# Patient Record
Sex: FEMALE | Race: WHITE | Employment: OTHER | URBAN - METROPOLITAN AREA
[De-identification: names, ages, dates, MRNs, and addresses within clinical notes are randomized per-mention and may not be internally consistent; named-entity substitution may affect disease eponyms.]

---

## 2020-02-28 ENCOUNTER — HOSPITAL ENCOUNTER (INPATIENT)
Age: 70
LOS: 24 days | Discharge: HOME | DRG: 885 | End: 2020-03-23
Attending: EMERGENCY MEDICINE | Admitting: PSYCHIATRY & NEUROLOGY
Payer: COMMERCIAL

## 2020-02-28 PROBLEM — F20.9 SCHIZOPHRENIA (HCC): Status: ACTIVE | Noted: 2020-02-28

## 2020-02-28 PROCEDURE — 1240000000 HC EMOTIONAL WELLNESS R&B

## 2020-02-28 PROCEDURE — 99285 EMERGENCY DEPT VISIT HI MDM: CPT

## 2020-02-28 RX ORDER — MAGNESIUM HYDROXIDE/ALUMINUM HYDROXICE/SIMETHICONE 120; 1200; 1200 MG/30ML; MG/30ML; MG/30ML
30 SUSPENSION ORAL 3 TIMES DAILY PRN
Status: DISCONTINUED | OUTPATIENT
Start: 2020-02-28 | End: 2020-03-23 | Stop reason: HOSPADM

## 2020-02-28 RX ORDER — TRAZODONE HYDROCHLORIDE 50 MG/1
50 TABLET ORAL NIGHTLY PRN
Status: DISCONTINUED | OUTPATIENT
Start: 2020-02-28 | End: 2020-03-23 | Stop reason: HOSPADM

## 2020-02-28 RX ORDER — ACETAMINOPHEN 325 MG/1
650 TABLET ORAL EVERY 4 HOURS PRN
Status: DISCONTINUED | OUTPATIENT
Start: 2020-02-28 | End: 2020-03-23 | Stop reason: HOSPADM

## 2020-02-28 RX ORDER — BENZTROPINE MESYLATE 1 MG/ML
2 INJECTION INTRAMUSCULAR; INTRAVENOUS DAILY PRN
Status: DISCONTINUED | OUTPATIENT
Start: 2020-02-28 | End: 2020-03-23 | Stop reason: HOSPADM

## 2020-02-28 RX ORDER — HYDROXYZINE HYDROCHLORIDE 25 MG/1
25 TABLET, FILM COATED ORAL 3 TIMES DAILY PRN
Status: DISCONTINUED | OUTPATIENT
Start: 2020-02-28 | End: 2020-03-23 | Stop reason: HOSPADM

## 2020-02-28 SDOH — HEALTH STABILITY: MENTAL HEALTH: HOW OFTEN DO YOU HAVE A DRINK CONTAINING ALCOHOL?: NEVER

## 2020-02-28 ASSESSMENT — SLEEP AND FATIGUE QUESTIONNAIRES
DO YOU HAVE DIFFICULTY SLEEPING: NO
DO YOU USE A SLEEP AID: NO
AVERAGE NUMBER OF SLEEP HOURS: 7

## 2020-02-28 ASSESSMENT — ENCOUNTER SYMPTOMS
BACK PAIN: 0
COUGH: 0
ABDOMINAL PAIN: 0

## 2020-02-28 ASSESSMENT — LIFESTYLE VARIABLES: HISTORY_ALCOHOL_USE: NO

## 2020-02-28 NOTE — ED PROVIDER NOTES
[address]  EMERGENCY DEPARTMENT ENCOUNTER      Pt Name: Cristal Marina  MRN: [id number]  [place] 1950  Date of evaluation: 20      CHIEF COMPLAINT       Chief Complaint   Patient presents with    Mental Health Problem     HISTORY OF PRESENT ILLNESS   HPI 68 y.o. female presents with c/o mental health evaluation. She was brought to the Emergency Department by Alejandro Ross on an application for emergency admission form (pink slip) from her outpatient behavioral health provider with concerns that the patient is unable to to care for her basic physical needs. The patient denies drug use, no attempts at self harm to this point. The patient no medical complaints at this time. Pt was seen at [facility] yesterday, laboratory studies done (reviewed) and she was discharged home. REVIEW OF SYSTEMS     Review of Systems   Constitutional: Negative for fever. HENT: Negative for congestion. Eyes: Negative for visual disturbance. Respiratory: Negative for cough. Cardiovascular: Negative for chest pain. Gastrointestinal: Negative for abdominal pain. Genitourinary: Negative for dysuria. Musculoskeletal: Negative for back pain. Skin: Negative for rash. Neurological: Negative for headaches. Hematological: Negative for adenopathy. Psychiatric/Behavioral: Positive for behavioral problems and confusion. Negative for suicidal ideas. PAST MEDICAL HISTORY   History reviewed. No pertinent past medical history. SURGICAL HISTORY       Past Surgical History:   Procedure Laterality Date     SECTION         CURRENT MEDICATIONS       Previous Medications    LURASIDONE (LATUDA) 20 MG TABS TABLET    Take 20 mg by mouth daily       ALLERGIES     has No Known Allergies. FAMILY HISTORY     has no family status information on file. SOCIAL HISTORY      reports that she has never smoked.  She has never used smokeless tobacco. She reports that she does not drink alcohol or use drugs.    PHYSICAL EXAM     INITIAL VITALS: BP (!) 152/94   Pulse 96   Temp 98.8 °F (37.1 °C) (Oral)   Resp 14   Ht 5' 2\" (1.575 m)   Wt 115 lb (52.2 kg)   SpO2 97%   BMI 21.03 kg/m²   Gen: NAD  Head: Normocephalic, atraumatic  Eye: Pupils equal round reactive to light, no conjunctivitis  Heart: Regular rate and rhythm no murmurs  Lungs: Clear to auscultation bilaterally, no respiratory distress  Chest wall: No crepitus, no tenderness palpation  Abdomen: Soft, nontender, nondistended, with no peritoneal signs  Skin: No diaphoresis. no lacerations. Neurologic: Patient is alert and oriented x3, motor and sensation is intact in all 4 extremities, speech is fluent  Extremities: Full range of motion, no cyanosis, no edema, no signs of trauma, no tenderness to palpation    MEDICAL DECISION MAKING:     MDM  68 y.o. female on   The patient is showing no signs of any acute active toxidrome. Pt is on an application for emergency admission form (pink slip)   The patient denies any overdose attempt or  medical complaints at this time. Laboratory studies from yesterday reviewed and are unremarkable. I did d/w the patient about getting a ct scan given her personality changes - she declines. Pt has no focal neurologic deficits, I doubt any intracranial abnormalities. Pt is medically clear for pyschiatric evaluation. Hospital Course:    evaluated the patient and discussed the case with the psychiatry service. The patient will be admitted to the [facility]. DIAGNOSTIC RESULTS     RADIOLOGY:All plain film, CT, MRI, and formal ultrasound images (except ED bedside ultrasound) are read by the radiologist and the images and interpretations are directly viewed by the emergency physician. LABS: All lab results were reviewed by myself, and all abnormals are listed below.   WBC 6.9 x10^9/L (Normal is 4.0-11.8 x10^9/L)   RBC 5.13 x10^12/L (Normal is 3.55-5.20 x10^12/L)   Hgb 15.8 g/dL (Normal is 11.7-16.1 g/dL) Hct 47.0 % (Normal is 35.0-47.0 %)   MCV 92 fL (Normal is  fL)   MCH 30.8 pg (Normal is 27.0-35.0 pg)   MCHC 33.6 g/dL (Normal is 31.0-36.0 g/dL)   RDW 14.0 % (Normal is 11.4-14.3 %)   Platelets 884 S09^4/I (Normal is 150-450 x10^9/L)   MPV 9.2  (Normal is 7.0-12.0)   Neutro Auto 72 %     Lymph Auto 19 %     Mono Auto 8 %     Eos, Auto 0 %     Basophil Auto 1 %     Neutro Absolute 5.0 x10^9/L (Normal is 1.5-6.6 x10^9/L)   Lymph Absolute 1.3 x10^9/L (Normal is 1.0-3.5 x10^9/L)   Mono Absolute 0.5 x10^9/L (Normal is 0.0-0.9 x10^9/L)   Eos Absolute 0.0 x10^9/L (Normal is 0.0-0.4 x10^9/L)   Baso Absolute 0.0 x10^9/L (Normal is 0.0-0.9 x10^9/L)   Prothrombin Time 12.1 seconds (Normal is 9.5-12.6 seconds)   INR 1.0  (Normal is 0.9-1.2)   Partial Thromboplastin Time 27 seconds (Normal is 26-37 seconds)   Sodium Level 140 mmol/L (Normal is 134-146 mmol/L)   Potassium Level 3.7 mmol/L (Normal is 3.5-5.0 mmol/L)   Chloride Level 109 mmol/L (Normal is  mmol/L)   CO2 24 mmol/L (Normal is 22-32 mmol/L)   Anion Gap 7 mmol/L (Normal is 4-12 mmol/L)   BUN 26 mg/dL (Normal is 5-27 mg/dL)   Creatinine Level 0.75 mg/dL (Normal is 0.40-1.00 mg/dL)   eGFR Non-AA 76 mL/min/1.73 m2 (Normal is >=59 mL/min/1.73 m2)   eGFR AA 92 mL/min/1.73 m2 (Normal is >=59 mL/min/1.73 m2)   Glucose Level 109 mg/dL (Normal is 65-99 mg/dL)   Calcium Level 11.0 mg/dL (Normal is 8.5-10.5 mg/dL)   Albumin Level 3.8 g/dL (Normal is 3.2-5.3 g/dL)   Alk Phos 88 unit/L (Normal is  unit/L)   AST 41 unit/L (Normal is 0-41 unit/L)   ALT 47 unit/L (Normal is 0-31 unit/L)   Bilirubin Total 0.9 mg/dL (Normal is 0.3-1.2 mg/dL)   Bilirubin Direct 0.1 mg/dL (Normal is 0.0-0.4 mg/dL)   Protein Total 6.2 g/dL (Normal is 6.0-8.0 g/dL)   Amylase Level 81 unit/L (Normal is  unit/L)   Lipase Level 27 unit/L (Normal is 17-40 unit/L)         EMERGENCY DEPARTMENT COURSE:   Vitals:    Vitals:    02/28/20 1809 02/28/20 1857   BP: (!) 143/75 (!) 152/94 Pulse: 112 96   Resp: 16 14   Temp: 98.8 °F (37.1 °C)    TempSrc: Oral    SpO2: 98% 97%   Weight: 115 lb (52.2 kg)    Height: 5' 2\" (1.575 m)        The patient was given the following medications while in the emergency department:  No orders of the defined types were placed in this encounter. -------------------------  CRITICAL CARE:   CONSULTS: None  PROCEDURES: Procedures     FINAL IMPRESSION      1. Behavior concern in adult          DISPOSITION/PLAN   DISPOSITION        PATIENT REFERRED TO:  No follow-up provider specified.     DISCHARGE MEDICATIONS:  New Prescriptions    No medications on file     Susan Banerjee MD  Attending Emergency Physician        Susan Banerjee MD  02/28/20 8250
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
## 2020-02-29 PROCEDURE — 1240000000 HC EMOTIONAL WELLNESS R&B

## 2020-02-29 ASSESSMENT — PAIN SCALES - GENERAL: PAINLEVEL_OUTOF10: 3

## 2020-02-29 ASSESSMENT — LIFESTYLE VARIABLES: HISTORY_ALCOHOL_USE: NO

## 2020-02-29 NOTE — GROUP NOTE
Group Therapy Note    Date: 2/28/2020    Group Start Time: 2030  Group End Time: 2100  Group Topic: Wrap-Up    KELVIN Ivan        Group Therapy Note    Attendees:          Patient's Goal:  Get back to normal    Notes:  Patient is encourage to attend groups and speak with staff while here    Status After Intervention:  Unchanged    Participation Level: Minimal    Participation Quality: Appropriate and Sharing      Speech:  hesitant      Thought Process/Content: Linear      Affective Functioning: Flat      Mood: anxious      Level of consciousness:  Alert and Oriented x4      Response to Learning: Able to retain information      Endings: None Reported    Modes of Intervention: Education, Support, Socialization, Exploration, Clarifying and Problem-solving      Discipline Responsible: [address], [facility] [employer]      Signature:  Yaent Pardo

## 2020-02-29 NOTE — GROUP NOTE
Group Therapy Note    Date: 2/29/2020    Group Start Time: 6131  Group End Time: 0900  Group Topic: Community Meeting    KELVIN Banerjee, CTRS    Pt did not attend [employer] d/t resting in room despite staff invitation to attend. 1:1 talk time offered as alternative to group session, pt declined.

## 2020-02-29 NOTE — PROGRESS NOTES
Medication History completed:    New medications: Latuda    Medications discontinued: aripiprazole tablets and injections, donepezil, trazodone, Trintellix    Changes to dosing: none    Stated allergies: NKDA    Other pertinent information: Medications confirmed with [employer].      Thank you,  Goyo Higginbotham, PharmD, BCPS  [phone]

## 2020-02-29 NOTE — GROUP NOTE
Group Therapy Note    Date: 2/29/2020    Group Start Time: 1330  Group End Time: 2622  Group Topic: Cognitive Skills    KELVIN Banerjee, CTRS    Pt did not attend RT skills group d/t resting in room despite staff invitation to attend. 1:1 talk time offered as alternative to group session, pt declined.

## 2020-03-01 PROCEDURE — 1240000000 HC EMOTIONAL WELLNESS R&B

## 2020-03-01 PROCEDURE — 6370000000 HC RX 637 (ALT 250 FOR IP): Performed by: PSYCHIATRY & NEUROLOGY

## 2020-03-01 RX ORDER — DONEPEZIL HYDROCHLORIDE 5 MG/1
5 TABLET, FILM COATED ORAL NIGHTLY
Status: DISCONTINUED | OUTPATIENT
Start: 2020-03-01 | End: 2020-03-09

## 2020-03-01 RX ADMIN — LURASIDONE HYDROCHLORIDE 20 MG: 40 TABLET, FILM COATED ORAL at 08:20

## 2020-03-01 RX ADMIN — VORTIOXETINE 5 MG: 5 TABLET, FILM COATED ORAL at 08:20

## 2020-03-01 NOTE — GROUP NOTE
Group Therapy Note    Date: 3/1/2020    Group Start Time: 1000  Group End Time: 6961  Group Topic: Group Therapy    [facility]    MARILYNN Pickens LSW        Group Therapy Note    Attendees:7/15           Patient's Goal:  Increase interpersonal relationship skills    Notes:  Patient was an active participant in group discussion and activity    Status After Intervention:  Unchanged    Participation Level:  Active Listener and Interactive    Participation Quality: Appropriate, Attentive, Sharing and Supportive      Speech:  normal      Thought Process/Content: Logical      Affective Functioning: Congruent      Mood: anxious and depressed      Level of consciousness:  Alert, Oriented x4 and Attentive      Response to Learning: Progressing to goal      Endings: None Reported    Modes of Intervention: Support, Socialization, Exploration, Clarifying and Activity      Discipline Responsible: /Counselor      Signature:  MARILYNN Pickens LSW

## 2020-03-01 NOTE — GROUP NOTE
Group Therapy Note    Date: 3/1/2020    Group Start Time: 0845  Group End Time: 0900  Group Topic: Community Meeting    MARTHA JENNIFER BUTLER    [employer], [place]    Patient's Goal:  To increase interpersonal interaction. Notes:  Pt attended and participated in group. Status After Intervention:  Improved    Participation Level:  Active Listener and Interactive    Participation Quality: Appropriate, Attentive and Sharing      Speech:  normal      Thought Process/Content: Logical      Affective Functioning: Congruent      Mood: euthymic      Level of consciousness:  Alert and Attentive      Response to Learning: Able to verbalize current knowledge/experience, Able to retain information and Progressing to goal      Endings: None Reported    Modes of Intervention: Education, Support, Socialization, Exploration, Problem-solving and Reality-testing      Discipline Responsible: Psychoeducational Specialist      Signature:  Lance Napoles

## 2020-03-01 NOTE — GROUP NOTE
Group Therapy Note    Date: 3/1/2020    Group Start Time: 1330  Group End Time: 2686  Group Topic: Cognitive Skills    KELVIN Banerjee, CTRS    Pt did not attend RT skills group d/t resting in room despite staff invitation to attend. 1:1 talk time offered as alternative to group session, pt declined.

## 2020-03-01 NOTE — GROUP NOTE
Group Therapy Note    Date: 3/1/2020    Group Start Time: 1100  Group End Time: 1140  Group Topic: Healthy Living/Wellness    [facility]    Ta Higuera RN        Group Therapy Note    Attendees: 9/15         Patient's Goal:  socialization    Notes:  Feedback game    Status After Intervention:  Improved    Participation Level: Interactive    Participation Quality: Appropriate      Speech:  normal      Thought Process/Content: Logical      Affective Functioning: Congruent      Mood: euthymic      Level of consciousness:  Alert      Response to Learning: Able to verbalize current knowledge/experience      Endings: None Reported    Modes of Intervention: Socialization      Discipline Responsible: Registered Nurse      Signature:  Ta Higuera RN

## 2020-03-01 NOTE — PLAN OF CARE
Problem: Altered Mood, Deterioration in Function:  Goal: Ability to perform activities of daily living will improve  Description  Ability to perform activities of daily living will improve  3/1/2020 1211 by Sari Serrato LPN  Outcome: Ongoing     Problem: Altered Mood, Deterioration in Function:  Goal: Maintenance of adequate nutrition will improve  Description  Maintenance of adequate nutrition will improve  3/1/2020 1211 by Sari Serrato LPN  Outcome: Ongoing     Problem: Altered Mood, Deterioration in Function:  Intervention: Reorientation process  Note:   Patient is alert and cooperative but has a delayed response when communicating at times. Patient has poor eye contact, thought blocking tendencies, lack of interest or motivation regarding programs and activities. Patient is also encouraged and reminded to select and consume 50+ percent of her meal. Programming continues to be encouraged.      Problem: Pain:  Goal: Pain level will decrease  Description  Pain level will decrease  Outcome: Ongoing     Problem: Pain:  Goal: Control of acute pain  Description  Control of acute pain  Outcome: Ongoing     Problem: Pain:  Goal: Control of chronic pain  Description  Control of chronic pain  Outcome: Ongoing

## 2020-03-02 PROCEDURE — 1240000000 HC EMOTIONAL WELLNESS R&B

## 2020-03-02 PROCEDURE — 6370000000 HC RX 637 (ALT 250 FOR IP): Performed by: PSYCHIATRY & NEUROLOGY

## 2020-03-02 RX ADMIN — LURASIDONE HYDROCHLORIDE 20 MG: 40 TABLET, FILM COATED ORAL at 09:00

## 2020-03-02 RX ADMIN — DONEPEZIL HYDROCHLORIDE 5 MG: 5 TABLET, FILM COATED ORAL at 20:48

## 2020-03-02 RX ADMIN — VORTIOXETINE 5 MG: 5 TABLET, FILM COATED ORAL at 09:00

## 2020-03-02 NOTE — GROUP NOTE
Group Therapy Note    Date: 3/2/2020    Group Start Time: 1600  Group End Time: 3051  Group Topic: Healthy Living/Wellness    [address], LPN        Group Therapy Note    Attendees: 7    Patient refused to attend 1600 group, education and encouragement provided. 1:1 alternative offered and patient continues to refuse. Staff will continue to encourage group participation.       Signature:  [place] LPN

## 2020-03-02 NOTE — PROGRESS NOTES
PROGRESS NOTE  The patient was a cooperative during the evaluation. The chart has been reviewed and the patient was interviewed at the bedside. The patient reported that she was not taking her medication and was spitting them out. The patient denied current thoughts of harming self or others. She denied visual or auditory hallucinations. There was no behavioral problem reported by the nursing staff reported from obsessive thinking and passing in the hallway. The patient did not need any emergency medications since admission. We will continue monitoring for symptoms of psychosis and to adjust her medications as needed. MENTAL STATUS EXAMINATION:    /73   Pulse 85   Temp 98.3 °F (36.8 °C) (Oral)   Resp 14   Ht 5' 2\" (1.575 m)   Wt 116 lb (52.6 kg)   SpO2 97%   BMI 21.22 kg/m²     MOOD-is dysphoric   Affect-is depressed  Patient feels helpless hopeless and worthless  Psychomotor activity : decreased. APPEARANCE:  Personal hygiene is fair. PSYCHOSIS:  Denies auditory or visual hallucinations. Denies paranoid delusions. ORIENTATION:  Oriented to time place and person. Recent and remote memory is grossly intact. Intelligence appears to be average  CONCENTRATION:  poor. SPEECH : goal directed , but slow . DIAGNOSIS:    Principal Problem:    Schizophrenia ([address]  Resolved Problems:    * No resolved hospital problems. *      LAB:    No results found for this or any previous visit (from the past 72 hour(s)).         TREATMENT PLAN:     lurasidone  20 mg Oral Daily    VORTIoxetine  5 mg Oral Daily    donepezil  5 mg Oral Nightly     acetaminophen, aluminum & magnesium hydroxide-simethicone, benztropine mesylate, hydrOXYzine, magnesium hydroxide, traZODone    Chart was reviewed and the

## 2020-03-02 NOTE — PLAN OF CARE
Problem: Altered Mood, Deterioration in Function:  Goal: Ability to perform activities of daily living will improve  Description  Ability to perform activities of daily living will improve  3/2/2020 1115 by Kaitlyn Okeefe LPN  Outcome: Ongoing  Note:   Patient is able to independently preform ADL's, needs redirected, and guidance due to not being able to concentrate and is distractible. Problem: Altered Mood, Deterioration in Function:  Goal: Maintenance of adequate nutrition will improve  Description  Maintenance of adequate nutrition will improve  3/2/2020 1115 by Kaitlyn Okeefe LPN  Outcome: Ongoing  Note:   Patients appetite is decreased, or the desire to eat is decreased. Patient ate 25% of breakfast. Patient states she is \"eating fine\"     Problem: Pain:  Goal: Control of chronic pain  Description  Control of chronic pain  Outcome: Ongoing  Note:   Patient has no complaints of pain at this time. She is up ambulating with no visible issues with gait.  Staff will continue to monitor

## 2020-03-02 NOTE — GROUP NOTE
Group Therapy Note    Date: 3/2/2020    Group Start Time: 1100  Group End Time: [phone]  Group Topic: Psychoeducation    KELVIN Banerjee, CTRS    Pt did not attend RT skills group d/t resting in room despite staff invitation to attend. 1:1 talk time offered as alternative to group session, pt declined.

## 2020-03-03 PROCEDURE — 6370000000 HC RX 637 (ALT 250 FOR IP): Performed by: PSYCHIATRY & NEUROLOGY

## 2020-03-03 PROCEDURE — 1240000000 HC EMOTIONAL WELLNESS R&B

## 2020-03-03 RX ADMIN — VORTIOXETINE 5 MG: 5 TABLET, FILM COATED ORAL at 10:07

## 2020-03-03 RX ADMIN — DONEPEZIL HYDROCHLORIDE 5 MG: 5 TABLET, FILM COATED ORAL at 21:11

## 2020-03-03 RX ADMIN — LURASIDONE HYDROCHLORIDE 20 MG: 40 TABLET, FILM COATED ORAL at 10:07

## 2020-03-03 NOTE — CARE COORDINATION
Writer received VM from Sharon Lama, pt's daughter, stating she was wondering if there were options for an in home nurse. Writer phoned Handy Crowell on file, [phone], and she stated that she was an hour behind us and was going into a meeting. She states she will phone SW on 3/4/2020 to discuss care of pt.

## 2020-03-03 NOTE — GROUP NOTE
Group Therapy Note    Date: 3/3/2020    Group Start Time: 1000  Group End Time: 2728  Group Topic: Psychotherapy    JOSE Mae        Group Therapy Note    Attendees: 8/17    Patient's Goal:  Increase interpersonal relationships      Notes:  Patient shared experiences      Status After Intervention: Unchanged     Participation Level:  Active Listener and Interactive     Participation Quality: Appropriate, Attentive and Sharing     Speech:  Normal     Thought Process/Content: Logical     Affective Functioning: Congruent     Mood: Euthymic     Level of consciousness:  Alert, Oriented x4 and Attentive     Response to Learning: Able to verbalize current knowledge/experience, Able to retain information and Capable of insight     Endings: None Reported     Modes of Intervention: Education, Socialization and Exploration     Discipline Responsible: /Counselor     Signature:  JOSE Mcmanus

## 2020-03-03 NOTE — GROUP NOTE
Group Therapy Note    Date: 3/3/2020    Group Start Time: 0845  Group End Time: 0900  Group Topic: [address]      Patient declined to attend community meeting/goal setting group at [address] [place] [address] despite encouragement from staff. 1:1 talk time offered by staff as alternative to group session.       Signature:  Sher Clemons

## 2020-03-04 PROCEDURE — 6370000000 HC RX 637 (ALT 250 FOR IP): Performed by: PSYCHIATRY & NEUROLOGY

## 2020-03-04 PROCEDURE — 1240000000 HC EMOTIONAL WELLNESS R&B

## 2020-03-04 RX ADMIN — LURASIDONE HYDROCHLORIDE 40 MG: 40 TABLET, FILM COATED ORAL at 09:59

## 2020-03-04 RX ADMIN — DONEPEZIL HYDROCHLORIDE 5 MG: 5 TABLET, FILM COATED ORAL at 20:03

## 2020-03-04 RX ADMIN — VORTIOXETINE 10 MG: 10 TABLET, FILM COATED ORAL at 09:59

## 2020-03-04 NOTE — PROGRESS NOTES
ON 3/4/2020] lurasidone  40 mg Oral Daily    donepezil  5 mg Oral Nightly     acetaminophen, aluminum & magnesium hydroxide-simethicone, benztropine mesylate, hydrOXYzine, magnesium hydroxide, traZODone    Chart was reviewed and the patient has been interviewed  Increase the Trintellix to 10 mg p.o. daily and Latuda to 40 mg p.o. daily for symptoms of depression and psychosis. Patient was discussed with the  and the treatment team.   Provided Supportive and insight-oriented psychotherapy psychotherapy  Recommended involvement in Unit milieu  Provided empathic listening, validation and support  Patient acknowledged and mutually decided to continue with current treatment plan  Discharge planning was discussed with the patient. Sugey Mcmahon MD        This note was created with the assistance of a speech-recognition program.  Although the intention is to generate a document that actually reflects the content of the visit, no guarantees can be provided that every mistake has been identified and corrected by editing.

## 2020-03-04 NOTE — CARE COORDINATION
Voicemail form Jolene Polanco, patient's daughter and legal guardian stating she was calling to speak with social work about home health and other concerns. Social work returned her call ([phone]) and was sent to voicemail, social work will continue to reach out to Hugo Langford.

## 2020-03-04 NOTE — GROUP NOTE
Group Therapy Note    Date: 3/4/2020    Group Start Time: 1330  Group End Time: 7332  Group Topic: Cognitive Skills    KELVIN Banerjee, CTRS    Pt did not attend RT skills group d/t resting in room despite staff invitation to attend. 1:1 talk time offered as alternative to group session, pt declined.

## 2020-03-04 NOTE — PROGRESS NOTES
PLAN:     lurasidone  20 mg Oral Daily    VORTIoxetine  5 mg Oral Daily    donepezil  5 mg Oral Nightly     acetaminophen, aluminum & magnesium hydroxide-simethicone, benztropine mesylate, hydrOXYzine, magnesium hydroxide, traZODone    Chart was reviewed and the patient has been interviewed  Continue the same medications as prescribed above  Patient was discussed with the  and the treatment team.   Provided Supportive and insight-oriented psychotherapy psychotherapy  Recommended involvement in Unit milieu  Provided empathic listening, validation and support  Patient acknowledged and mutually decided to continue with current treatment plan  Discharge planning was discussed with the patient. Paradise Lockett MD        This note was created with the assistance of a speech-recognition program.  Although the intention is to generate a document that actually reflects the content of the visit, no guarantees can be provided that every mistake has been identified and corrected by editing.

## 2020-03-04 NOTE — PLAN OF CARE
Problem: Altered Mood, Deterioration in Function:  Goal: Ability to perform activities of daily living will improve  Description  Ability to perform activities of daily living will improve  Outcome: Ongoing   Patient still needs some encouragement to tend to ADL's. Staff did encourage the patient to shower, but the patient refused. Staff will continue to show support and offer help with ADL's as needed. Problem: Altered Mood, Deterioration in Function:  Goal: Maintenance of adequate nutrition will improve  Description  Maintenance of adequate nutrition will improve  Outcome: Ongoing   Patient has been out of her room for every meal this shift and has consumed 100% of all meals. Staff will continue to show support and encourage the patient to eat meals in the day room. Problem: Pain:  Goal: Pain level will decrease  Description  Pain level will decrease  Outcome: Ongoing   Patient has had no complaints of pain during this shift. Staff will continue to show support and the patient agrees to seek out the staff if the pain returns or worsens.

## 2020-03-04 NOTE — GROUP NOTE
Group Therapy Note    Date: 3/4/2020    Group Start Time: 1100  Group End Time: 1130  Group Topic: Psychoeducation    KELVIN Banerjee, CTRS      Pt did not attend RT skills group d/t resting in room despite staff invitation to attend. 1:1 talk time offered as alternative to group session, pt declined.

## 2020-03-05 PROCEDURE — 1240000000 HC EMOTIONAL WELLNESS R&B

## 2020-03-05 PROCEDURE — 6370000000 HC RX 637 (ALT 250 FOR IP): Performed by: PSYCHIATRY & NEUROLOGY

## 2020-03-05 RX ADMIN — VORTIOXETINE 10 MG: 10 TABLET, FILM COATED ORAL at 08:42

## 2020-03-05 RX ADMIN — TRAZODONE HYDROCHLORIDE 50 MG: 50 TABLET ORAL at 21:41

## 2020-03-05 RX ADMIN — DONEPEZIL HYDROCHLORIDE 5 MG: 5 TABLET, FILM COATED ORAL at 21:41

## 2020-03-05 RX ADMIN — LURASIDONE HYDROCHLORIDE 40 MG: 40 TABLET, FILM COATED ORAL at 08:42

## 2020-03-05 NOTE — GROUP NOTE
Group Therapy Note    Date: 3/5/2020    Group Start Time: 0900  Group End Time: 9067  Group Topic: Community Meeting    [address]    Patient refused to attend community meeting/ goals group at 0900 after encouragement from staff. 1:1 talk time provided by staff.      Signature:  Jacinda Clarke

## 2020-03-05 NOTE — PROGRESS NOTES
PROGRESS NOTE  The chart has been reviewed and the patient was interviewed in the conference room. The patient is still exhibiting anxiety, depressed mood and restlessness. She denied current thoughts of harming self or others. She denied visual or auditory hallucinations. The patient exhibited preoccupied and obsessive thinking with baseline paranoia. The patient was taking her medications with close observation by the nursing staff. The patient is still exhibiting poor insight and impaired judgment which might put her and others at risk. The case has been discussed with the nursing staff and the . The case also discussed with the patient's guardian. The patient cannot take care of her basic needs by her own. She needs an nursing home placement. PT/OT has been ordered for the procedure of pass- R test prior to nursing home placement. We will continue monitoring for symptoms of depression, psychotic symptoms and to adjust her medications as needed. MENTAL STATUS EXAMINATION:    /73   Pulse 77   Temp 98.1 °F (36.7 °C)   Resp 16   Ht 5' 2\" (1.575 m)   Wt 116 lb (52.6 kg)   SpO2 99%   BMI 21.22 kg/m²     MOOD-is dysphoric   Affect-is depressed  Patient feels helpless hopeless and worthless  Psychomotor activity : decreased. APPEARANCE:  Personal hygiene is poor and patient is neglecting his appearance. Patient is somewhat unkempt  PSYCHOSIS:  Denies auditory or visual hallucinations. Denies paranoid delusions. ORIENTATION:  Oriented to time place and person. Recent and remote memory is grossly intact. Intelligence appears to be average  CONCENTRATION:  poor. SPEECH : goal directed , but slow .     DIAGNOSIS:    Principal Problem:    Schizophrenia ([facility] [address]  Resolved Problems:    * No resolved hospital problems. *      LAB:    No results found for this or any previous visit (from the past 72 hour(s)). TREATMENT PLAN:     VORTIoxetine  10 mg Oral Daily    lurasidone  40 mg Oral Daily    donepezil  5 mg Oral Nightly     acetaminophen, aluminum & magnesium hydroxide-simethicone, benztropine mesylate, hydrOXYzine, magnesium hydroxide, traZODone    Chart was reviewed the patient has been interviewed  Sinew the same medications as prescribed above  Patient was discussed with the  and the treatment team.   Provided Supportive and insight-oriented psychotherapy psychotherapy  Recommended involvement in Unit milieu  Provided empathic listening, validation and support  Patient acknowledged and mutually decided to continue with current treatment plan  Discharge planning was discussed with the patient. Lanie Taveras MD        This note was created with the assistance of a speech-recognition program.  Although the intention is to generate a document that actually reflects the content of the visit, no guarantees can be provided that every mistake has been identified and corrected by editing.

## 2020-03-05 NOTE — FLOWSHEET NOTE
*Patient participated in the [facility]       03/05/20 1406   Encounter Summary   Services provided to: Patient   Referral/Consult From: Rounding   Continue Visiting   (3/5/20)   Complexity of Encounter Moderate   Length of Encounter 30 minutes   Spiritual Assessment Completed Yes   Spiritual/Sikh   Type Spiritual support   Assessment Calm; Approachable   Intervention Active listening   Outcome Receptive

## 2020-03-05 NOTE — GROUP NOTE
Group Therapy Note    Date: 3/5/2020    Group Start Time: 1100  Group End Time: 3515  Group Topic: Relaxation    [facility]  [address]    Patient refused to attend relaxation/ coping skills group at 1100 after encouragement from staff. 1:1 talk time provided by staff.      Signature:  Cookie Spencer

## 2020-03-05 NOTE — GROUP NOTE
Group Therapy Note    Date: 3/5/2020    Group Start Time: 1000  Group End Time: 4881  Group Topic: Psychotherapy    [facility] JOSE Nuñez        Group Therapy Note    Attendees: 10/17    Patient's Goal:  Increase interpersonal relationships and express emotions adequately     Notes:  Patient shared experiences and offered support and insight     Status After Intervention: Unchanged     Participation Level:  Active Listener and Interactive     Participation Quality: Appropriate, Attentive and Sharing     Speech:  Normal     Thought Process/Content: Logical     Affective Functioning: Congruent     Mood: Euthymic     Level of consciousness:  Alert, Oriented x4 and Attentive     Response to Learning: Able to verbalize current knowledge/experience, Able to verbalize/acknowledge new learning, Able to retain information and Capable of insight     Endings: None Reported     Modes of Intervention: Education, Socialization and Exploration     Discipline Responsible: /Counselor     Signature:  JOSE Alba

## 2020-03-05 NOTE — CARE COORDINATION
Voicemail from patient daughter/legal guardian Luis Mata asking for a return call [phone]. Klaudia Lazo reports she is looking into alternate home health options and states she has only been able to find [place] and [facility] [address] which would be out of pocket and asked for options, she states patient cannot work with [facility] due to owing them money. Social work suggested contacting [facility] to determine coverage and contracted agencies rather than paying out of pocket. Josefaobiejustine Lazo asked about assisted living options, social work reported assisted living is not typically covered through insurance and spoke about ECF placement. Klaudia Lazo would like Guerita Gleason completed to have a determination and possible placement as an option for patient if she does not \"clear up. \" Social work will complete PASRR as requested. Copy of patient insurance card sent by fax machine to legal guardian's email.

## 2020-03-06 PROCEDURE — 6370000000 HC RX 637 (ALT 250 FOR IP): Performed by: PSYCHIATRY & NEUROLOGY

## 2020-03-06 PROCEDURE — 1240000000 HC EMOTIONAL WELLNESS R&B

## 2020-03-06 RX ADMIN — VORTIOXETINE 10 MG: 10 TABLET, FILM COATED ORAL at 09:25

## 2020-03-06 RX ADMIN — TRAZODONE HYDROCHLORIDE 50 MG: 50 TABLET ORAL at 22:07

## 2020-03-06 RX ADMIN — LURASIDONE HYDROCHLORIDE 40 MG: 40 TABLET, FILM COATED ORAL at 09:25

## 2020-03-06 RX ADMIN — DONEPEZIL HYDROCHLORIDE 5 MG: 5 TABLET, FILM COATED ORAL at 22:07

## 2020-03-06 NOTE — PLAN OF CARE
Problem: Altered Mood, Deterioration in Function:  Goal: Maintenance of adequate nutrition will improve  Description  Maintenance of adequate nutrition will improve  Outcome: Ongoing   Patient denies thoughts of self harm. Denies hallucinations, observed responding. Pacing hallways. Patient ate 100% of meals. Compliant with medications. 15 minute checks maintained for safety.

## 2020-03-06 NOTE — PLAN OF CARE
Problem: Altered Mood, Deterioration in Function:  Goal: Ability to perform activities of daily living will improve  Description  Ability to perform activities of daily living will improve  3/5/2020 2344 by Lisa Valera  Outcome: Ongoing  3/5/2020 1003 by Aileen Krabbe, LPN  Outcome: Ongoing  Note:   Patient continues to ambulate about the milieu without difficulty. No complaints voiced, denies pain. Non slip socks remains in place for safety. Instructed on and encouraged to attend to ADL's, patient declined at this time will continue to encourage. No violent or negative behaviors noted at this time. Will continue to provide safe, calm, environment and use verbal de-escalation techniques when needed. Support and reassurance given as needed. Goal: Maintenance of adequate nutrition will improve  Description  Maintenance of adequate nutrition will improve  3/5/2020 2344 by Crista Valera  Outcome: Ongoing  3/5/2020 1003 by Aileen Krabbe, LPN  Outcome: Ongoing  Note:   Patient able to sit in day area amongst peers. Patient consumed more than half of meal tray, fluids tolerated and encouraged. Patient denies any concerns or complaints at this time. Patient was resting in her room much of the shift. She has very distractible and preoccupied in her thoughts. She washed up a little with soap. Some anxiety noted.

## 2020-03-06 NOTE — GROUP NOTE
Group Therapy Note    Date: 3/6/2020    Group Start Time: 0845  Group End Time: 0900  Group Topic: Community Meeting    KELVIN Banerjee, CTRS    Pt did not attend [employer] d/t resting in room despite staff invitation to attend. 1:1 talk time offered as alternative to group session, pt declined.

## 2020-03-06 NOTE — PROGRESS NOTES
Senia  Distance: 100ft  Comments: pt demonstrates safe gait with no LOB regardless of gait deviations(pt states she is walking this way due to not wearing shoes)  Stairs/Curb  Stairs?: No     Balance  Sitting - Static: Good  Sitting - Dynamic: Good  Standing - Static: Good  Standing - Dynamic: Good        Plan   Plan  Plan Comment: no skilled PT needed at this time      Therapy Time   Individual Concurrent Group Co-treatment   Time In 1320         Time Out 1340         Minutes [facility], PT

## 2020-03-07 PROCEDURE — 1240000000 HC EMOTIONAL WELLNESS R&B

## 2020-03-07 PROCEDURE — 6370000000 HC RX 637 (ALT 250 FOR IP): Performed by: PSYCHIATRY & NEUROLOGY

## 2020-03-07 RX ADMIN — VORTIOXETINE 10 MG: 10 TABLET, FILM COATED ORAL at 09:10

## 2020-03-07 RX ADMIN — LURASIDONE HYDROCHLORIDE 40 MG: 40 TABLET, FILM COATED ORAL at 09:10

## 2020-03-07 RX ADMIN — DONEPEZIL HYDROCHLORIDE 5 MG: 5 TABLET, FILM COATED ORAL at 21:40

## 2020-03-07 RX ADMIN — TRAZODONE HYDROCHLORIDE 50 MG: 50 TABLET ORAL at 21:40

## 2020-03-07 NOTE — GROUP NOTE
Group Therapy Note    Date: 3/6/2020    Group Start Time: 2050  Group End Time: 2100  Group Topic: Relaxation    [facility]    Armando García RN      Group Therapy Note    Attendees: 12/16       Patient's Goal:  To acquire coping skills by developing relaxation techniques    Notes:  Pt attended and actively participated in the Relaxation group this evening.     Status After Intervention:  Improved    Participation Level: Interactive    Participation Quality: Appropriate and Attentive    Speech:  normal    Thought Process/Content: Logical    Affective Functioning: Congruent    Mood: calm and attempting to relax    Level of consciousness:  Alert and Oriented x4    Response to Learning: Progressing to goal    Endings: None Reported    Modes of Intervention: Education, Support, and Exploration    Discipline Responsible: Registered Nurse        Signature:  Armando García RN

## 2020-03-07 NOTE — PROGRESS NOTES
PROGRESS NOTE  The chart has been reviewed and the patient was interviewed at the bedside. The patient reported feeling \"better\". The patient denied current thoughts of harming self or others. The patient denied visual or auditory hallucinations. She still exhibiting hyperactivity and restlessness. The patient was somewhat paranoid and mistrustful. She was guarded during the evaluation. Her mood is depressed and affect was flat. There was no behavioral problem reported by the nursing staff. The patient has been compliant with her medications with close observation by the nursing staff. There was no side effects of her medications. The case has been discussed with the  and the nursing staff. The patient did not has the PT/OT consultation which was ordered prior to [facility] placement. The patient is currently within her baseline and the discharge is pending on the placement. MENTAL STATUS EXAMINATION:    /74   Pulse 73   Temp 98.2 °F (36.8 °C) (Oral)   Resp 14   Ht 5' 2\" (1.575 m)   Wt 121 lb (54.9 kg)   SpO2 99%   BMI 22.13 kg/m²     MOOD-is dysphoric   Affect-is depressed  Patient feels helpless hopeless and worthless  Psychomotor activity : decreased. APPEARANCE:  Personal hygiene is fair. PSYCHOSIS:  Denies auditory or visual hallucinations. Denies paranoid delusions. ORIENTATION:  Oriented to time place and person. Recent and remote memory is grossly intact. Intelligence appears to be average  CONCENTRATION:  poor. SPEECH : goal directed , but slow . DIAGNOSIS:    Principal Problem:    Schizophrenia ([address]  Resolved Problems:    * No resolved hospital problems.  *      LAB:    No results found for this or any previous visit (from the past 72

## 2020-03-07 NOTE — PLAN OF CARE
Problem: Altered Mood, Deterioration in Function:  Goal: Ability to perform activities of daily living will improve  Description  Ability to perform activities of daily living will improve  Outcome: Ongoing  Note:   Pt able to ambulate without difficulty. Encouraged to tend to activities of daily living but no response. Will continue to encourage pt to perform ADLs. Problem: Altered Mood, Deterioration in Function:  Goal: Maintenance of adequate nutrition will improve  Description  Maintenance of adequate nutrition will improve  3/6/2020 2219 by Yogi Greene RN  Note:   Per shift change report pt has been eating about 50-75% each meal.  However, pt declines the snacks offered by staff this evening. Problem: Pain:  Goal: Pain level will decrease  Description  Pain level will decrease  Outcome: Ongoing  Note:   Pt denies any pain at this time.

## 2020-03-07 NOTE — PLAN OF CARE
[facility] Interdisciplinary Treatment Plan Note     Review Date & Time: 3/7/2020 9:52am    Admission Type:   Admission Type: Involuntary(Guardian gave verbal consent)    Reason for admission:  Reason for Admission: Unable to care for basic needs, brought in by [place] police on pink slip    Estimated Length of Stay :  5-7 days  Estimated Discharge Date Update: to be determined by physician    PATIENT STRENGTHS:  Patient Strengths:Strengths: Communication, Connection to output provider, Positive Support, Social Skills, Medication Compliance  Patient Strengths and Limitations:Limitations: Multiple barriers to leisure interests  Addictive Behavior:Addictive Behavior  In the past 3 months, have you felt or has someone told you that you have a problem with:  : None  Do you have a history of Chemical Use?: No  Do you have a history of Alcohol Use?: No  Do you have a history of Street Drug Abuse?: No  Histroy of Prescripton Drug Abuse?: No  Medical Problems:   Past Medical History:   Diagnosis Date    Hypertension        Risk:  Fall RiskTotal: 90  Ken Scale Ken Scale Score: 22  BVC Total: 0    Change in scores no.  Changes to plan of Care no    Status EXAM:   Status and Exam  Normal: No  Facial Expression: Avoids Gaze  Affect: Blunt  Level of Consciousness: Alert  Mood:Normal: No  Mood: Depressed  Motor Activity:Normal: No  Motor Activity: Decreased  Interview Behavior: Cooperative  Preception: [place] to Person, Gabrielle Loss to Time, [place] to Place  Attention:Normal: No  Attention: Distractible, Unable to Concentrate  Thought Processes: Circumstantial  Thought Content:Normal: No  Thought Content: Preoccupations, Poverty of Content  Hallucinations: None  Delusions: No  Delusions: Obsessions  Memory:Normal: No  Memory: Poor Recent, Poor Remote  Insight and Judgment: No  Insight and Judgment: Poor Judgment, Poor Insight  Present Suicidal Ideation: No  Present Homicidal Ideation: No    Daily Assessment Last Entry:   Daily Sleep (WDL): Within Defined Limits         Patient Currently in Pain: Denies  Daily Nutrition (WDL): Within Defined Limits    Patient Monitoring:  Frequency of Checks: 4 times per hour, close    Psychiatric Symptoms:   Depression Symptoms  Depression Symptoms: Loss of interest, Isolative, Feelings of helplessness, Feelings of hopelessess  Anxiety Symptoms  Anxiety Symptoms: No problems reported or observed. Giuliana Symptoms  Giuliana Symptoms: Poor judgment     Psychosis Symptoms  Delusion Type: No problems reported or observed. Suicide Risk CSSR-S:  1) Within the past month, have you wished you were dead or wished you could go to sleep and not wake up? : No  2) Have you actually had any thoughts of killing yourself? : No  6) Have you ever done anything, started to do anything, or prepared to do anything to end your life?: Yes  Change in Result no Change in Plan of care no    EDUCATION:   Learner Progress Toward Treatment Goals: Reviewed results and recommendations of this team, Reviewed group plan and strategies, Reviewed signs, symptoms and risk of self harm and violent behavior, Reviewed goals and plan of care    Method: individual education, small group, verbal education    Outcome: verbalized understanding, but needs reinforcement to obtain goals    PATIENT GOALS:   short term: \"walking around. \"  Long term: \"walking, continue medications. \"    PLAN/TREATMENT RECOMMENDATIONS UPDATE:   Continue with group therapies, education of coping skills   Continue to monitor patient on unit. Medications provided/ medication compliance by patient. Continue for plans to obtain long term goals after discharge.     SHORT-TERM GOALS UPDATE:  Time frame for Short-Term Goals: 8-14days     LONG-TERM GOALS UPDATE:  Time frame for Long-Term Goals: 6 months  Members Present in Team Meeting: See Signature Sheet    Clinton Carreno MSW, LSW

## 2020-03-07 NOTE — GROUP NOTE
Group Therapy Note Date: 03/07/2020  Group Start Time: 0845  Group End Time: 0900  Group Topic: Community Meeting & Trivia  Attendees: 12/16  Unit: [facility]  Goal: To provide a goal to improve his/her mental health that is obtainable by 8:00PM this evening. To increase cognitive function by the use of trivia. Notes: Ellen's goal for the day is to look at the [place]lia for relaxation. Yajaira Zuniga stated she feels \"ok\" today.   Status After Intervention: Improved  Participation Level: Interactive, Active Listener   Participation Quality: Appropriate, Attentive, Sharing, Supportive  Speech: Normal  Thought Process/Content: Logical  Affective Functioning: Congruent  Mood: Euthymic  Level of consciousness: Alert, Oriented x4 and Attentive  Response to Learning: Able to verbalize current knowledge/experience, Able to verbalize/acknowledge new learning and Able to retain information  Endings: None Reported  Modes of Intervention: Education, Support, Socialization, Exploration, and Activity  Discipline Responsible: Psychoeducational Specialist  Signature: Marlon Anaya, [address]

## 2020-03-07 NOTE — GROUP NOTE
Group Therapy Note    Date: 3/7/2020    Group Start Time: 1100  Group End Time: 6420  Group Topic: Healthy Living/Wellness    KELVIN Ramsay LPN        Group Therapy Note    Attendees: 9/15         Patient's Goal:  To Participate in group    Status After Intervention:  Improved    Participation Level:  Active Listener    Participation Quality: Appropriate      Speech:  normal      Thought Process/Content: Logical      Affective Functioning: Congruent      Mood: euthymic      Level of consciousness:  Alert, Oriented x4 and Attentive      Response to Learning: Progressing to goal      Endings: None Reported    Modes of Intervention: Activity      Discipline Responsible: Licensed Practical Nurse      Signature:  Shahab Caban LPN

## 2020-03-07 NOTE — PLAN OF CARE
Problem: Altered Mood, Deterioration in Function:  Goal: Ability to perform activities of daily living will improve  Description: Ability to perform activities of daily living will improve  3/7/2020 1820 by Lisa Johnson RN  Outcome: Ongoing  Pt able to brush teeth with prompting. She is confused and seclusive to room. She was medication compliant. Problem: Altered Mood, Deterioration in Function:  Goal: Maintenance of adequate nutrition will improve  Description: Maintenance of adequate nutrition will improve  Outcome: Ongoing  Pt was out for meals and ate all meals and snacks. Problem: Pain:  Goal: Pain level will decrease  Description: Pain level will decrease  Outcome: Ongoing  Pt denied any current pain. Problem: Pain:  Goal: Control of acute pain  Description: Control of acute pain  Outcome: Ongoing  Pt denied any current pain. Problem: Pain:  Goal: Control of chronic pain  Description: Control of chronic pain  Outcome: Ongoing  Pt denied any current pain.

## 2020-03-08 PROCEDURE — 1240000000 HC EMOTIONAL WELLNESS R&B

## 2020-03-08 PROCEDURE — 6370000000 HC RX 637 (ALT 250 FOR IP): Performed by: PSYCHIATRY & NEUROLOGY

## 2020-03-08 RX ADMIN — VORTIOXETINE 10 MG: 10 TABLET, FILM COATED ORAL at 08:45

## 2020-03-08 RX ADMIN — TRAZODONE HYDROCHLORIDE 50 MG: 50 TABLET ORAL at 22:11

## 2020-03-08 RX ADMIN — LURASIDONE HYDROCHLORIDE 40 MG: 40 TABLET, FILM COATED ORAL at 08:45

## 2020-03-08 RX ADMIN — DONEPEZIL HYDROCHLORIDE 5 MG: 5 TABLET, FILM COATED ORAL at 22:11

## 2020-03-08 NOTE — GROUP NOTE
Group Therapy Note    Date: 3/8/2020    Group Start Time: 1000  Group End Time: 0103  Group Topic: Cognitive Skills    KELVIN Suazo, CTRS    Pt did not attend 1000 skills group d/t resting in room despite staff invitation to attend. 1:1 talk time offered as alternative to group session, pt declined.            Signature:  Jaun M Vinson

## 2020-03-08 NOTE — PROGRESS NOTES
PROGRESS NOTE  The chart has been reviewed and the patient was interviewed at the bedside. The patient is still reporting baseline and anxiety. However, she denied worsening symptoms of depression. She denied visual or auditory hallucinations. She is still exhibiting hyperactivity and restlessness. She was paranoid and mistrustful. Her affect was flat and his mood was depressed. The patient was guarded during the evaluation. There was no behavioral problem reported by the nursing staff. The patient was taking her medications as prescribed with close observation by the nursing staff. There was no side effects of the medication. We will continue monitoring for symptoms of mood disorder, suicidal behavior and to adjust her medications as needed. MENTAL STATUS EXAMINATION:    /86   Pulse 72   Temp 98.1 °F (36.7 °C) (Oral)   Resp 14   Ht 5' 2\" (1.575 m)   Wt 121 lb (54.9 kg)   SpO2 99%   BMI 22.13 kg/m²     MOOD-is dysphoric   Affect-is depressed  Patient denied helpless hopeless and worthless  Psychomotor activity : decreased. APPEARANCE:  Personal hygiene is fair. PSYCHOSIS:  Denies auditory or visual hallucinations. Denies paranoid delusions. ORIENTATION:  Oriented to time place and person. Recent and remote memory is grossly intact. Intelligence appears to be average  CONCENTRATION:  poor. SPEECH : goal directed , but slow . DIAGNOSIS:    Principal Problem:    Schizophrenia ([address]  Resolved Problems:    * No resolved hospital problems. *      LAB:    No results found for this or any previous visit (from the past 72 hour(s)).         TREATMENT PLAN:     VORTIoxetine  10 mg Oral Daily    lurasidone  40 mg Oral Daily    donepezil  5 mg Oral Nightly     acetaminophen, aluminum &

## 2020-03-08 NOTE — GROUP NOTE
Group Therapy Note    Date: 3/8/2020    Group Start Time: 0840  Group End Time: 0900  Group Topic: Community Meeting    [facility] JENNIFER Aguila, [place]    Pt did not attend 0900 community meeting/ goal setting group d/t resting in room despite staff invitation to attend. 1:1 talk time offered as alternative to group session, pt declined.           Signature:  Dharmesh Abbasi

## 2020-03-08 NOTE — PROGRESS NOTES
aluminum & magnesium hydroxide-simethicone, benztropine mesylate, hydrOXYzine, magnesium hydroxide, traZODone    Chart was reviewed and the patient has been interviewed  Sinew the same medications as prescribed above with no changes  Patient was discussed with the  and the treatment team.   Provided Supportive and insight-oriented psychotherapy psychotherapy  Recommended involvement in Unit milieu  Provided empathic listening, validation and support  Patient acknowledged and mutually decided to continue with current treatment plan  Discharge planning was discussed with the patient. Marissa Fair MD        This note was created with the assistance of a speech-recognition program.  Although the intention is to generate a document that actually reflects the content of the visit, no guarantees can be provided that every mistake has been identified and corrected by editing.

## 2020-03-08 NOTE — CARE COORDINATION
patient refused to attend psychoeducation group at 1330 after encouragement from staff.   1:1 talk time provided as alternative to group session

## 2020-03-09 PROCEDURE — 1240000000 HC EMOTIONAL WELLNESS R&B

## 2020-03-09 PROCEDURE — 6370000000 HC RX 637 (ALT 250 FOR IP): Performed by: PSYCHIATRY & NEUROLOGY

## 2020-03-09 RX ORDER — DONEPEZIL HYDROCHLORIDE 10 MG/1
10 TABLET, FILM COATED ORAL NIGHTLY
Status: DISCONTINUED | OUTPATIENT
Start: 2020-03-10 | End: 2020-03-21

## 2020-03-09 RX ADMIN — DONEPEZIL HYDROCHLORIDE 5 MG: 5 TABLET, FILM COATED ORAL at 21:05

## 2020-03-09 RX ADMIN — TRAZODONE HYDROCHLORIDE 50 MG: 50 TABLET ORAL at 21:04

## 2020-03-09 RX ADMIN — LURASIDONE HYDROCHLORIDE 40 MG: 40 TABLET, FILM COATED ORAL at 10:00

## 2020-03-09 RX ADMIN — VORTIOXETINE 10 MG: 10 TABLET, FILM COATED ORAL at 09:59

## 2020-03-09 NOTE — GROUP NOTE
Group Therapy Note    Date: 3/9/2020    Group Start Time: 0845  Group End Time: 0350  Group Topic: Community Meeting    [facility]    [address] [place], [place]    Pt did not attend [employer] d/t resting in room despite staff invitation to attend. 1:1 talk time offered as alternative to group session, pt declined.

## 2020-03-09 NOTE — GROUP NOTE
Group Therapy Note    Date: 3/9/2020    Group Start Time: 1100  Group End Time: 1130  Group Topic: Psychoeducation    KELVIN Banerjee, CTRS    Pt did not attend RT skills group d/t resting in room despite staff invitation to attend. 1:1 talk time offered as alternative to group session, pt declined.

## 2020-03-09 NOTE — PLAN OF CARE
Problem: Pain:  Goal: Control of chronic pain  Description: Control of chronic pain  Outcome: Met This Shift  Note: No c/o pain this shift. Problem: Altered Mood, Deterioration in Function:  Goal: Ability to perform activities of daily living will improve  Description: Ability to perform activities of daily living will improve  Outcome: Ongoing  Note: With some encouragement pt agreed to shower. She di get into shower & appeared to wash her hair, but she forgot to take off her bra prior to getting into shower. Problem: Altered Mood, Deterioration in Function:  Goal: Maintenance of adequate nutrition will improve  Description: Maintenance of adequate nutrition will improve  Outcome: Ongoing  Note: Pt declined breakfast. She tried to decline lunch, but writer assisted her out of bed and to dayroom. Sat her down & gave her tray. She ate apprx. 2/3 of her meal. At dinner she came  out to dining area on her own and ate fairly well.

## 2020-03-09 NOTE — GROUP NOTE
Group Therapy Note    Date: 3/8/2020    Group Start Time: 2050  Group End Time: 2100  Group Topic: Relaxation    [facility]    Sage Bautista RN      Group Therapy Note    Attendees: 12/15        Patient's Goal:  To acquire coping skills by developing relaxation techniques     Notes:  Pt attended but did not participate in Relaxation group this evening.     Status After Intervention:  Unchanged     Participation Level: None     Participation Quality: Resistant     Speech:  Pt not talking, just staring at nothing     Thought Process/Content: MERARI, pt not talking during group session     Affective Functioning: Flat and Constricted/Restricted     Mood: empty     Level of consciousness:  Alert, Preoccupied and Inattentive     Response to Learning: Resistant     Endings: None Reported     Modes of Intervention: Education, Support and Socialization     Discipline Responsible: Registered Nurse        Signature:  Sage Bautista RN

## 2020-03-10 PROBLEM — F02.80 ALZHEIMER'S DISEASE (HCC): Status: ACTIVE | Noted: 2020-03-10

## 2020-03-10 PROBLEM — G30.9 ALZHEIMER'S DISEASE (HCC): Status: ACTIVE | Noted: 2020-03-10

## 2020-03-10 PROCEDURE — 1240000000 HC EMOTIONAL WELLNESS R&B

## 2020-03-10 PROCEDURE — 6370000000 HC RX 637 (ALT 250 FOR IP): Performed by: PSYCHIATRY & NEUROLOGY

## 2020-03-10 RX ADMIN — LURASIDONE HYDROCHLORIDE 40 MG: 40 TABLET, FILM COATED ORAL at 08:51

## 2020-03-10 RX ADMIN — TRAZODONE HYDROCHLORIDE 50 MG: 50 TABLET ORAL at 20:59

## 2020-03-10 RX ADMIN — VORTIOXETINE 10 MG: 10 TABLET, FILM COATED ORAL at 08:51

## 2020-03-10 RX ADMIN — DONEPEZIL HYDROCHLORIDE 10 MG: 10 TABLET, FILM COATED ORAL at 21:00

## 2020-03-10 RX ADMIN — HYDROXYZINE HYDROCHLORIDE 25 MG: 25 TABLET, FILM COATED ORAL at 20:59

## 2020-03-10 NOTE — PROGRESS NOTES
PROGRESS NOTE  The chart has been reviewed and the patient was interviewed at the bedside. The patient was guarded with depressed mood and a flat affect during the evaluation. However, she was not cooperative in answering the questions with brief answers. The patient denied current thoughts of harming self or others. She denied visual or auditory hallucinations. She was confused and suspicious. It was reported by the nursing staff that the patient needs encouragement for her meals and to take care of her ADLs. There was no behavioral problem reported by the nursing staff. The patient has been compliant with her medications with close observation by the nursing staff. The patient refusing to attend therapy groups in the unit and was isolative to self. The patient agreed to increase Aricept to 10 mg for symptoms of dementia. We will continue monitoring for symptoms of depression, suicidal behavior and to adjust her medications as needed. MENTAL STATUS EXAMINATION:    /72   Pulse 68   Temp 98.4 °F (36.9 °C) (Oral)   Resp 14   Ht 5' 2\" (1.575 m)   Wt 121 lb (54.9 kg)   SpO2 99%   BMI 22.13 kg/m²     MOOD-is dysphoric   Affect-is depressed  Patient denied feels helpless hopeless and worthless  Psychomotor activity : decreased. APPEARANCE:  Personal hygiene is poor and patient is neglecting his appearance. Patient is somewhat unkempt  PSYCHOSIS:  Denies auditory or visual hallucinations. Denies paranoid delusions. ORIENTATION:  Oriented to time place and person. Recent and remote memory is grossly intact. Intelligence appears to be average  CONCENTRATION:  poor. SPEECH : goal directed , but slow .     DIAGNOSIS:    Principal Problem:    Schizophrenia ([facility] [address]  Resolved Problems:    * No

## 2020-03-10 NOTE — GROUP NOTE
Group Therapy Note    Date: 3/10/2020    Group Start Time: 1100  Group End Time: 36  Group Topic: Psychoeducation    KELVIN Banerjee, CTRS    Pt did not attend RT skills group d/t resting in room despite staff invitation to attend. 1:1 talk time offered as alternative to group session, pt declined.

## 2020-03-10 NOTE — PLAN OF CARE
Problem: Pain:  Goal: Pain level will decrease  Description: Pain level will decrease  Outcome: Ongoing  Note: Patient denies any pain at this time staff will continue to monitor. Problem: Altered Mood, Deterioration in Function:  Goal: Ability to perform activities of daily living will improve  Description: Ability to perform activities of daily living will improve  3/9/2020 1927 by Jaren Elliott LPN  Outcome: Ongoing  Note: With some encouragement pt agreed to shower. She di get into shower & appeared to wash her hair, but she forgot to take off her bra prior to getting into shower.

## 2020-03-10 NOTE — GROUP NOTE
Group Therapy Note    Date: 3/10/2020    Group Start Time: 1000  Group End Time: 7775  Group Topic: Psychotherapy    JOSE Saul        Group Therapy Note    Pt declined to attend psychotherapy at 1000 am despite encouragement. Pt offered 1:1 and refused.

## 2020-03-10 NOTE — PLAN OF CARE
Problem: Altered Mood, Deterioration in Function:  Goal: Maintenance of adequate nutrition will improve  Description: Maintenance of adequate nutrition will improve  Outcome: Ongoing   Patient ate all three meals at more than 75%. Problem: Pain:  Goal: Pain level will decrease  Description: Pain level will decrease  Outcome: Ongoing   No complaints of pain during this shift.

## 2020-03-11 PROCEDURE — 1240000000 HC EMOTIONAL WELLNESS R&B

## 2020-03-11 PROCEDURE — 6370000000 HC RX 637 (ALT 250 FOR IP): Performed by: PSYCHIATRY & NEUROLOGY

## 2020-03-11 RX ADMIN — TRAZODONE HYDROCHLORIDE 50 MG: 50 TABLET ORAL at 20:55

## 2020-03-11 RX ADMIN — HYDROXYZINE HYDROCHLORIDE 25 MG: 25 TABLET, FILM COATED ORAL at 20:55

## 2020-03-11 RX ADMIN — DONEPEZIL HYDROCHLORIDE 10 MG: 10 TABLET, FILM COATED ORAL at 20:55

## 2020-03-11 NOTE — PROGRESS NOTES
PROGRESS NOTE  The chart has been reviewed and the patient was interviewed at the bedside. The patient was confused, guarded with depressed mood and a flat affect during the evaluation. She was reported to be pacing in the day area prior to the evaluation. The patient denied current thoughts of harming self or others. She denied visual or auditory hallucinations. She was confused and suspicious. It was reported by the nursing staff that the patient needs encouragement for her meals and to take care of her ADLs. There was no behavioral problem reported by the nursing staff. The patient has been compliant with her medications with close observation by the nursing staff. The patient refusing to attend therapy groups in the unit and was isolative to self despite of continuous encouragement by the nursing staff. The patient denied side effect of Aricept to 10 mg. We will continue monitoring for symptoms of depression, suicidal behavior and to adjust her medications as needed. CMP, CBC with diff, TSH, B 12 and folic acid level will be ordered. MENTAL STATUS EXAMINATION:    BP (!) 148/82   Pulse 70   Temp 98.3 °F (36.8 °C) (Oral)   Resp 14   Ht 5' 2\" (1.575 m)   Wt 121 lb (54.9 kg)   SpO2 99%   BMI 22.13 kg/m²     MOOD-is dysphoric   Affect-is depressed  Patient feels helpless hopeless and worthless  Psychomotor activity : decreased. APPEARANCE:  Personal hygiene is poor and patient is neglecting his appearance. Patient is somewhat unkempt  PSYCHOSIS:  Denies auditory or visual hallucinations. Denies paranoid delusions. ORIENTATION:  Oriented to time place and person. Recent and remote memory is grossly intact. Intelligence appears to be average  CONCENTRATION:  poor.     SPEECH :

## 2020-03-11 NOTE — PLAN OF CARE
Problem: Altered Mood, Deterioration in Function:  Goal: Maintenance of adequate nutrition will improve  Description: Maintenance of adequate nutrition will improve  3/10/2020 2256 by Dusty Meléndez RN  Outcome: Ongoing     Problem: Pain:  Goal: Pain level will decrease  Description: Pain level will decrease  3/10/2020 2256 by Dusty Meléndez RN  Outcome: Ongoing     Patient denies suicidal ideations/homicidal ideations and auditory/visual hallucinations. Patient denies feelings of depression but reports some feelings of anxiety and not feeling restful. Patient remains isolative but approaches staff when in need of anything. Patient frequently asks for snacks and things to drink and consumes all of it. Patient denies any current pain when asked. Q 15 minutes checks maintained for patient safety.

## 2020-03-11 NOTE — GROUP NOTE
Group Therapy Note    Date: 3/11/2020    Group Start Time: 1330  Group End Time: 0057  Group Topic: Cognitive Skills    KELVIN Banerjee, CTRS    Pt did not attend RT skills group d/t resting in room despite staff invitation to attend. 1:1 talk time offered as alternative to group session, pt declined.

## 2020-03-12 PROCEDURE — 1240000000 HC EMOTIONAL WELLNESS R&B

## 2020-03-12 PROCEDURE — 6370000000 HC RX 637 (ALT 250 FOR IP): Performed by: PSYCHIATRY & NEUROLOGY

## 2020-03-12 RX ORDER — RISPERIDONE 1 MG/1
0.5 TABLET, FILM COATED ORAL DAILY
Status: DISCONTINUED | OUTPATIENT
Start: 2020-03-13 | End: 2020-03-17

## 2020-03-12 RX ADMIN — TRAZODONE HYDROCHLORIDE 50 MG: 50 TABLET ORAL at 21:01

## 2020-03-12 RX ADMIN — VORTIOXETINE 10 MG: 10 TABLET, FILM COATED ORAL at 08:25

## 2020-03-12 RX ADMIN — DONEPEZIL HYDROCHLORIDE 10 MG: 10 TABLET, FILM COATED ORAL at 21:01

## 2020-03-12 RX ADMIN — LURASIDONE HYDROCHLORIDE 40 MG: 40 TABLET, FILM COATED ORAL at 08:25

## 2020-03-12 ASSESSMENT — PAIN SCALES - GENERAL: PAINLEVEL_OUTOF10: 0

## 2020-03-12 NOTE — GROUP NOTE
Group Therapy Note    Date: 3/12/2020    Group Start Time: 1100  Group End Time: [phone]  Group Topic: Psychoeducation    KELVIN Banerjee, CTRS    Pt did not attend RT skills group d/t resting in room despite staff invitation to attend. 1:1 talk time offered as alternative to group session, pt declined.

## 2020-03-12 NOTE — GROUP NOTE
Group Therapy Note    Date: 3/12/2020    Group Start Time: 1600  Group End Time: 36  Group Topic: Music Therapy    [facility]    Sidney Sevilla LPN        Group Therapy Note    Attendees: 9/14         Patient's Goal:  Discharge     Notes:      Status After Intervention:  Unchanged    Participation Level: None    Participation Quality: Attentive      Speech:  mute      Thought Process/Content: Delusional      Affective Functioning: Flat      Mood: anxious      Level of consciousness:  Attentive      Response to Learning: Progressing to goal      Endings: None Reported    Modes of Intervention: Media      Discipline Responsible: Licensed Practical Nurse      Signature:  Sidney Sevilla LPN

## 2020-03-12 NOTE — PROGRESS NOTES
PROGRESS NOTE  The chart has been reviewed and the patient was interviewed at the bedside. The writer called the patient's guardian (her daughter Noble Montes) for over 15 minutes discussing the updates in her case, placement options and the prognosis of the case. All her questions have been answered and all concerns were addressed. She reported feeling upset the nurses are not calling her every day and giving her updates about her mother.    The patient was confused, guarded with depressed mood and a flat affect during the evaluation. She reported \"I don't want to talk to my daughter Noble Montes. She is mean and she is the one who put me at the hospital\". The patient denied current thoughts of harming self or others.  She denied visual or auditory hallucinations.  She was confused and suspicious.  It was reported by the nursing staff that the patient needs encouragement for her meals and to take care of her ADLs and was refusing her medications early this morning.  There was no behavioral problem reported by the nursing staff. The patient refusing to attend therapy groups in the unit and was isolative to self despite of continuous encouragement by the nursing staff.  The patient denied side effect of medications.  We will continue monitoring for symptoms of depression, suicidal behavior and to adjust her medications as needed. CMP, CBC with diff, TSH, B 12 and folic acid level ordered and the results are still pending. .                                                                  MENTAL STATUS EXAMINATION:    /88   Pulse 61   Temp 98.6 °F (37 °C) (Oral)   Resp 14   Ht 5' 2\" (1.575 m)   Wt 121 lb (54.9 kg)   SpO2 99%   BMI 22.13 kg/m²     MOOD-is dysphoric   Affect-is depressed  Patient feels helpless hopeless and worthless  Psychomotor activity : decreased. APPEARANCE:  Personal hygiene is poor and patient is neglecting his appearance. Patient is somewhat unkempt  PSYCHOSIS:  Denies auditory or visual hallucinations. Denies paranoid delusions. ORIENTATION:  Oriented to time place and person. Recent and remote memory is grossly intact. Intelligence appears to be average  CONCENTRATION:  poor. SPEECH : goal directed , but slow . DIAGNOSIS:    Principal Problem:    Alzheimer's disease ([facility] [address]  Active Problems:    Schizophrenia ([facility] [address]  Resolved Problems:    * No resolved hospital problems. *      LAB:    No results found for this or any previous visit (from the past 72 hour(s)). TREATMENT PLAN:     donepezil  10 mg Oral Nightly    VORTIoxetine  10 mg Oral Daily    lurasidone  40 mg Oral Daily     acetaminophen, aluminum & magnesium hydroxide-simethicone, benztropine mesylate, hydrOXYzine, magnesium hydroxide, traZODone    Chart was reviewed patient has been interviewed  Continue the medications as prescribed above  Patient was discussed with the  and the treatment team.   Provided Supportive and insight-oriented psychotherapy psychotherapy  Recommended involvement in Unit milieu  Provided empathic listening, validation and support  Patient acknowledged and mutually decided to continue with current treatment plan  Discharge planning was discussed with the patient. Marcio Rolon MD        This note was created with the assistance of a speech-recognition program.  Although the intention is to generate a document that actually reflects the content of the visit, no guarantees can be provided that every mistake has been identified and corrected by editing.

## 2020-03-12 NOTE — GROUP NOTE
Group Therapy Note    Date: 3/11/2020    Group Start Time: 2030  Group End Time: 2100  Group Topic: Group Therapy    [facility] Davina Florian RN        Group Therapy Note    Attendees: 14/14    PSYCHOEDUCATION GROUP NOTE    Date: 3/11/2020  Start Time: 2030  End Time: 2100    Number Participants in Group:  14/14    Goal:  Patient will demonstrate increased interpersonal interaction   Topic: safety-group    Participation Level:     None  Minimal   x Active Listener x Interactive    Monopolizing         Participation Quality:  x Appropriate  Inappropriate   x       Attentive        Intrusive   x       Sharing        Resistant   x       Supportive        Lethargic         Modes of Intervention:  x Education x Support  Exploration   x Clarifying x Problem Solving  Confrontation   x Socialization x Limit Setting  Reality Testing   x Activity  Movement  Media    Other:                  Comments:             Signature:  Ervin Myers RN

## 2020-03-12 NOTE — PLAN OF CARE
Problem: Altered Mood, Deterioration in Function:  Goal: Maintenance of adequate nutrition will improve  Description: Maintenance of adequate nutrition will improve  3/12/2020 0135 by Luh Worthington RN  Outcome: Ongoing     Problem: Pain:  Goal: Pain level will decrease  Description: Pain level will decrease  3/12/2020 0135 by Luh Worthington RN  Outcome: Ongoing     Patient denies any pain this shift. Patient ate 100% of HS snack x3. Patient denies all but Is aloof of peeps makes no eye contact, will  the middle of veliz and not respond to being called but if asked if she would like her medication or a snack she puts her hand out. Patient noted just standing in room door way and denies any needs when asked or will not respond. Patient reminded staff are always present and encouraged to seek staff if an need arises. Q 15 minute checks maintained for patient safety. Male

## 2020-03-13 PROCEDURE — 6370000000 HC RX 637 (ALT 250 FOR IP): Performed by: PSYCHIATRY & NEUROLOGY

## 2020-03-13 PROCEDURE — 1240000000 HC EMOTIONAL WELLNESS R&B

## 2020-03-13 RX ADMIN — HYDROXYZINE HYDROCHLORIDE 25 MG: 25 TABLET, FILM COATED ORAL at 21:41

## 2020-03-13 RX ADMIN — VORTIOXETINE 10 MG: 10 TABLET, FILM COATED ORAL at 08:49

## 2020-03-13 RX ADMIN — DONEPEZIL HYDROCHLORIDE 10 MG: 10 TABLET, FILM COATED ORAL at 21:41

## 2020-03-13 RX ADMIN — TRAZODONE HYDROCHLORIDE 50 MG: 50 TABLET ORAL at 21:41

## 2020-03-13 RX ADMIN — RISPERIDONE 0.5 MG: 1 TABLET ORAL at 08:50

## 2020-03-13 NOTE — GROUP NOTE
Group Therapy Note    Date: 3/13/2020    Group Start Time: 1000  Group End Time: 7003  Group Topic: Psychotherapy    KELVIN Carter, JACKLYNW        Group Therapy Note    Pt declined to attend psychotherapy at 1000 am despite encouragement. Pt offered 1:1 and refused.

## 2020-03-13 NOTE — GROUP NOTE
Group Therapy Note    Date: 3/13/2020    Group Start Time: 1400  Group End Time: 1500  Group Topic: Cognitive Skills    KELVIN Banerjee, CTRS    Pt did not attend RT skills group d/t resting in room despite staff invitation to attend. 1:1 talk time offered as alternative to group session, pt declined.

## 2020-03-13 NOTE — PLAN OF CARE
Problem: Altered Mood, Deterioration in Function:  Goal: Ability to perform activities of daily living will improve  Description: Ability to perform activities of daily living will improve  3/13/2020 0948 by Bard Nina LPN  Outcome: Ongoing   Patient still struggles to perform ADL's on her own. Staff continues to encourage the patient to shower and brush her teeth. Patient remains resistant to do so. Staff offers 1:1 time with the patient  to try and gain a better rapport with the patient, but patient remains guarded and does not wish to talk to the staff. Problem: Altered Mood, Deterioration in Function:  Goal: Maintenance of adequate nutrition will improve  Description: Maintenance of adequate nutrition will improve  3/13/2020 0948 by Bard Nina LPN  Outcome: Ongoing   Patient is still refusing to eat her meals, but does ask for peanut butter after trays have been collected. Staff is encouraging the patient to sit with the dietary staff and order select meals. Patient did sit with dietary staff and order meals for today. Staff will continue to support the patient and 15 minute safety rounds will be continued. Problem: Pain:  Goal: Pain level will decrease  Description: Pain level will decrease  3/13/2020 0948 by Bard Nina LPN  Outcome: Ongoing    Patient has had no complaints of pain throughout this shift. Patient agrees to seek out the staff if the pain returns.

## 2020-03-13 NOTE — GROUP NOTE
Group Therapy Note    Date: 3/12/2020    Group Start Time: 2010  Group End Time: 2020  Group Topic: Wrap-Up    [facility]    Nayeli Gaines, ETHEL        Group Therapy Note:     Pt refused to attend Wrap-up group this evening after encouragement from staff. 1:1 talk time offered but pt declined. Will continue to encourage patient to attend unit group programming.         Signature:  Seward Leventhal, RN

## 2020-03-13 NOTE — GROUP NOTE
Group Therapy Note    Date: 3/13/2020    Group Start Time: 0845  Group End Time: 7002  Group Topic: Community Meeting    [facility]    [address], [place]    Pt did not attend [employer] d/t resting in room despite staff invitation to attend. 1:1 talk time offered as alternative to group session, pt declined.

## 2020-03-13 NOTE — CARE COORDINATION
Social work contacted [facility] to follow up on PAS review, they report the PAS is in que to be reviewed and social work should be hearing from them shortly.

## 2020-03-13 NOTE — PROGRESS NOTES
10 mg Oral Nightly    VORTIoxetine  10 mg Oral Daily    lurasidone  40 mg Oral Daily     acetaminophen, aluminum & magnesium hydroxide-simethicone, benztropine mesylate, hydrOXYzine, magnesium hydroxide, traZODone    Chart was reviewed and the patient has been interviewed  [address] and start risperidone 0.5 mg p.o. twice daily  Patient was discussed with the  and the treatment team.   Provided Supportive and insight-oriented psychotherapy psychotherapy  Recommended involvement in Unit milieu  Provided empathic listening, validation and support  Patient acknowledged and mutually decided to continue with current treatment plan  Discharge planning was discussed with the patient. Deonte Thompson MD        This note was created with the assistance of a speech-recognition program.  Although the intention is to generate a document that actually reflects the content of the visit, no guarantees can be provided that every mistake has been identified and corrected by editing.

## 2020-03-13 NOTE — CARE COORDINATION
Writer phoned Dr. Yanet Pavon, regarding pt, after receiving a message that he would like a phone call. Dr. Holt Speaker states he spoke to pt's daughter and she was agreeable to placing pt in an ECF. Writer stated to Dr. Yanet Pavon that a Cristel Minder was completed, pt's daughter, Wilmer Meigs, was updated. The PASRR has not been approved/denied as of today. Bhavani Galicia will continue to update, provider, DESHAWN and Lea Terry CM, with updates.

## 2020-03-14 PROCEDURE — 1240000000 HC EMOTIONAL WELLNESS R&B

## 2020-03-14 PROCEDURE — 6370000000 HC RX 637 (ALT 250 FOR IP): Performed by: PSYCHIATRY & NEUROLOGY

## 2020-03-14 RX ADMIN — VORTIOXETINE 10 MG: 10 TABLET, FILM COATED ORAL at 08:47

## 2020-03-14 RX ADMIN — RISPERIDONE 0.5 MG: 1 TABLET ORAL at 08:47

## 2020-03-14 RX ADMIN — HYDROXYZINE HYDROCHLORIDE 25 MG: 25 TABLET, FILM COATED ORAL at 20:56

## 2020-03-14 RX ADMIN — DONEPEZIL HYDROCHLORIDE 10 MG: 10 TABLET, FILM COATED ORAL at 20:56

## 2020-03-14 RX ADMIN — TRAZODONE HYDROCHLORIDE 50 MG: 50 TABLET ORAL at 20:56

## 2020-03-14 NOTE — PLAN OF CARE
Problem: Altered Mood, Deterioration in Function:  Goal: Ability to perform activities of daily living will improve  Description: Ability to perform activities of daily living will improve  3/14/2020 1028 by Chele Brice RN  Outcome: Ongoing  Note: Patient not answering assessment questions aside from shaking head \"no\"  to if she wanted to harm herself. She appears to be responding to internal stimuli at times. Allowed physical assessment - within normal limits. Medication compliant, isolative to room. Patient safety maintained through Q15 min and random safety checks. Problem: Altered Mood, Deterioration in Function:  Goal: Maintenance of adequate nutrition will improve  Description: Maintenance of adequate nutrition will improve  3/14/2020 1028 by Chele Brice RN  Outcome: Ongoing  Note: Patient refused breakfast tray, drank whole glass of water with her medications. Will continue to encourage patient to eat at meal and snack times. Problem: Role Relationship:  Goal: Ability to participate appropriately in conversations will improve  Description: Ability to participate appropriately in conversations will improve  3/14/2020 1028 by Chele Brice RN  Outcome: Ongoing  Note: Patient does not interact with writer during assessment       Problem: Safety:  Goal: Ability to remain free from injury will improve  Description: Ability to remain free from injury will improve  3/14/2020 1028 by Chele Brice RN  Outcome: Ongoing  Note: No injury acquired this shift.

## 2020-03-14 NOTE — GROUP NOTE
Group Therapy Note    Date: 3/14/2020    Group Start Time: 1330  Group End Time: 7035  Group Topic: Recreational    [id number] [facility], [facility]        Group Therapy Note    Attendees: 4    Pt did not attend Therapeutic Recreation d/t resting in room despite staff invitation to attend. 1:1 talk time offered as alternative to group session, pt declined.

## 2020-03-14 NOTE — GROUP NOTE
Group Therapy Note    Date: 3/14/2020    Group Start Time: 2130  Group End Time: 2200  Group Topic: Relaxation    KELVIN Brush        Group Therapy Note    Attendees: 15       Patient's Goal:      Notes:  aloof    Status After Intervention:      Participation Level:     Participation Quality:       Speech:         Thought Process/Content:       Affective Functioning:       Mood:       Level of consciousness:        Response to Learning:       Endings:     Modes of Intervention:       Discipline Responsible: [address] [place] [address]      Signature:  Vikas Brush

## 2020-03-14 NOTE — PLAN OF CARE
Problem: Altered Mood, Deterioration in Function:  Goal: Ability to perform activities of daily living will improve  Description: Ability to perform activities of daily living will improve  Outcome: Ongoing  Note: Pt continues to ambulate without difficulty. Pt able to go to the bathroom independently. However, pt is not tending to personal hygiene. Pt encouraged to shower and brush teeth but declines. Pt sates \"I'm fine. \"  Educate pt about the importance of good hygiene, but no evidence of learning. Problem: Altered Mood, Deterioration in Function:  Goal: Maintenance of adequate nutrition will improve  Description: Maintenance of adequate nutrition will improve  Outcome: Ongoing  Note: Per report from day shift staff, pt did not eat her meals today. ALso, pt did not eat snack this evening. Problem: Role Relationship:  Goal: Ability to participate appropriately in conversations will improve  Description: Ability to participate appropriately in conversations will improve  Outcome: Ongoing  Note: Pt currently not interactive with staff,  will only answer few Yes/No questions during assessment. Pt is also isolative to room, not social with peers, and does not attend groups. Problem: Safety:  Goal: Ability to remain free from injury will improve  Description: Ability to remain free from injury will improve  Outcome: Ongoing  Note: Pt remains free of harm. Safe environment maintained. Every 15 minute checks for safety continued per unit policy. Will continue to monitor for safety and provide support and reassurance as needed.

## 2020-03-14 NOTE — GROUP NOTE
Group Therapy Note    Date: 3/14/2020    Group Start Time: 0900  Group End Time: 0915  Group Topic: [id number], [place]        Group Therapy Note    Attendees: 5  Pt did not attend [employer] d/t resting in room despite staff invitation to attend. 1:1 talk time offered as alternative to group session, pt declined.

## 2020-03-15 PROCEDURE — 1240000000 HC EMOTIONAL WELLNESS R&B

## 2020-03-15 PROCEDURE — 6370000000 HC RX 637 (ALT 250 FOR IP): Performed by: PSYCHIATRY & NEUROLOGY

## 2020-03-15 RX ADMIN — RISPERIDONE 0.5 MG: 1 TABLET ORAL at 10:33

## 2020-03-15 RX ADMIN — VORTIOXETINE 10 MG: 10 TABLET, FILM COATED ORAL at 10:33

## 2020-03-15 NOTE — GROUP NOTE
Group Therapy Note    Date: 3/15/2020    Group Start Time: 0900  Group End Time: 0915  Group Topic: [facility], [address]         Group Therapy Note    Attendees: 3    Pt did not attend [employer] d/t resting in room despite staff invitation to attend. 1:1 talk time offered as alternative to group session, pt declined.

## 2020-03-15 NOTE — PROGRESS NOTES
PROGRESS NOTE  The chart has been reviewed and the patient was interviewed in the conference room. The patient reported feeling \"fine\" and denied any new complaints. The patient denied side effects of risperidone. The patient is still exhibited depressed mood with flat affect and was guarded during the evaluation. However, she was exhibiting superficial smiles and answering the questions briefly. Patient has been taking her medications. She is still needing encouragement by the nursing staff for her ADLs. There was no behavioral problem reported by the nursing staff. The patient was isolative to self and exhibiting poverty of thoughts. The patient refused the dementia lab work. We will continue monitoring for symptoms of depression, psychosis and liver problems to adjust her medications as needed. MENTAL STATUS EXAMINATION:    /78   Pulse 80   Temp 98.3 °F (36.8 °C) (Oral)   Resp 14   Ht 5' 2\" (1.575 m)   Wt 119 lb (54 kg)   SpO2 99%   BMI 21.77 kg/m²     MOOD-is dysphoric   Affect-is depressed  Patient feels helpless hopeless and worthless  Psychomotor activity : decreased. APPEARANCE:  Personal hygiene is poor and patient is neglecting his appearance. Patient is somewhat unkempt  PSYCHOSIS:  Denies auditory or visual hallucinations. Denies paranoid delusions. ORIENTATION:  Oriented to time place and person. Recent and remote memory is grossly intact. Intelligence appears to be average  CONCENTRATION:  poor. SPEECH : goal directed , but slow . DIAGNOSIS:    Principal Problem:    Alzheimer's disease ([facility] [address]  Active Problems:    Schizophrenia ([facility] [address]  Resolved Problems:    * No resolved hospital problems.  *      LAB:    No results found for this or any previous visit (from the past

## 2020-03-15 NOTE — PLAN OF CARE
Problem: Altered Mood, Deterioration in Function:  Goal: Ability to perform activities of daily living will improve  Description: Ability to perform activities of daily living will improve  3/14/2020 2232 by Ward Lesches, RN  Outcome: Ongoing  Note: Patient continues to isolate to her room, writer encouraged patient to call her daughter as according to patient she has not talked to her in over a week, patient got agitated and told writer to [employer]", patient then came out and was asking for water and was pleasant with writer,  she remains withdrawn and poverty of content, patient answered evening assessment questions with \"I'm fine\"  Problem: Altered Mood, Deterioration in Function:  Goal: Maintenance of adequate nutrition will improve  Description: Maintenance of adequate nutrition will improve  3/14/2020 2232 by Ward Lesches, RN  Outcome: Ongoing  Note: Patient ate evening snack and drank fluids during evening shift  Problem: Role Relationship:  Goal: Ability to participate appropriately in conversations will improve  Minimal interaction during all assessments on part of patient.

## 2020-03-15 NOTE — GROUP NOTE
Group Therapy Note    Date: 3/15/2020    Group Start Time: 1000  Group End Time: 4930  Group Topic: Psychotherapy    [facility]    JOSE Blum        Group Therapy Note    Pt declined to attend psychotherapy at 1000 am despite encouragement. Pt offered 1:1 and refused.       Signature:  JOSE Blum

## 2020-03-15 NOTE — PLAN OF CARE
[facility] Interdisciplinary Treatment Plan Note     Review Date & Time: 03/14/2020 0930    Admission Type:   Admission Type: Involuntary(Guardian gave verbal consent)    Reason for admission:  Reason for Admission: Unable to care for basic needs, brought in by [place] police on pink slip    Estimated Length of Stay :  5-7 days  Estimated Discharge Date Update: to be determined by physician    PATIENT STRENGTHS:  Patient Strengths:Strengths: Communication, Connection to output provider, Positive Support, Social Skills, Medication Compliance  Patient Strengths and Limitations:Limitations: Multiple barriers to leisure interests  Addictive Behavior:Addictive Behavior  In the past 3 months, have you felt or has someone told you that you have a problem with:  : None  Do you have a history of Chemical Use?: No  Do you have a history of Alcohol Use?: No  Do you have a history of Street Drug Abuse?: No  Histroy of Prescripton Drug Abuse?: No  Medical Problems:   Past Medical History:   Diagnosis Date    Hypertension        Risk:  Fall RiskTotal: 76  Ken Scale Ken Scale Score: 21  BVC Total: 1    Change in scores no.  Changes to plan of Care no    Status EXAM:   Status and Exam  Normal: No  Facial Expression: Avoids Gaze, Expressionless, Flat  Affect: Blunt  Level of Consciousness: Alert  Mood:Normal: No  Mood: Empty, Irritable  Motor Activity:Normal: No  Motor Activity: Unusual Posture/Gait  Interview Behavior: Evasive, Irritable, Uncooperative/Withdrawn  Preception: [place] to Person  Attention:Normal: No  Attention: Distractible, Unable to Concentrate  Thought Processes: Blocking  Thought Content:Normal: No  Thought Content: Preoccupations, Poverty of Content  Hallucinations: None  Delusions: No  Delusions: Obsessions  Memory:Normal: No  Memory: Poor Recent, Poor Remote  Insight and Judgment: No  Insight and Judgment: Poor Judgment, Poor Insight, Unmotivated, Unrealistic  Present Suicidal Ideation: No  Present Homicidal Ideation: No    Daily Assessment Last Entry:   Daily Sleep (WDL): Within Defined Limits         Patient Currently in Pain: Denies  Daily Nutrition (WDL): Within Defined Limits  Appetite Change: Decreased  Barriers to Nutrition: Cognitive impairment  Level of Assistance: Independent/Self    Patient Monitoring:  Frequency of Checks: 4 times per hour, close    Psychiatric Symptoms:   Depression Symptoms  Depression Symptoms: Isolative, Loss of interest, Increased irritability  Anxiety Symptoms  Anxiety Symptoms: Generalized  Giuliana Symptoms  Giuliana Symptoms: No problems reported or observed. Psychosis Symptoms  Delusion Type: No problems reported or observed. Suicide Risk CSSR-S:  1) Within the past month, have you wished you were dead or wished you could go to sleep and not wake up? : No  2) Have you actually had any thoughts of killing yourself? : No  6) Have you ever done anything, started to do anything, or prepared to do anything to end your life?: Yes  Change in Result no Change in Plan of care no    EDUCATION:   Learner Progress Toward Treatment Goals: Reviewed results and recommendations of this team, Reviewed group plan and strategies, Reviewed signs, symptoms and risk of self harm and violent behavior, Reviewed goals and plan of care    Method: individual education, small group, verbal education    Outcome: verbalized understanding, but needs reinforcement to obtain goals    PATIENT GOALS:  Short term: Pt did not attend  Long term: Pt did not attend    PLAN/TREATMENT RECOMMENDATIONS UPDATE:   Continue with group therapies, education of coping skills   Continue to monitor patient on unit. Medications provided/ medication compliance by patient. Continue for plans to obtain long term goals after discharge.     SHORT-TERM GOALS UPDATE:  Time frame for Short-Term Goals: 8-14days     LONG-TERM GOALS UPDATE:  Time frame for Long-Term Goals: 6 months  Members Present in Team

## 2020-03-15 NOTE — GROUP NOTE
Group Therapy Note    Date: 3/15/2020    Group Start Time: 1430  Group End Time: 7830  Group Topic: Cognitive Skills    [address] [facility], [facility]        Group Therapy Note    Attendees: 8  Pt did not attend Therapeutic Recreation d/t resting in room despite staff invitation to attend. 1:1 talk time offered as alternative to group session, pt declined.

## 2020-03-16 PROCEDURE — 97535 SELF CARE MNGMENT TRAINING: CPT

## 2020-03-16 PROCEDURE — 97166 OT EVAL MOD COMPLEX 45 MIN: CPT

## 2020-03-16 PROCEDURE — 1240000000 HC EMOTIONAL WELLNESS R&B

## 2020-03-16 PROCEDURE — 6370000000 HC RX 637 (ALT 250 FOR IP): Performed by: PSYCHIATRY & NEUROLOGY

## 2020-03-16 RX ADMIN — RISPERIDONE 0.5 MG: 1 TABLET ORAL at 09:26

## 2020-03-16 RX ADMIN — DONEPEZIL HYDROCHLORIDE 10 MG: 10 TABLET, FILM COATED ORAL at 20:51

## 2020-03-16 RX ADMIN — VORTIOXETINE 10 MG: 10 TABLET, FILM COATED ORAL at 09:26

## 2020-03-16 ASSESSMENT — PAIN SCALES - GENERAL: PAINLEVEL_OUTOF10: 0

## 2020-03-16 NOTE — PROGRESS NOTES
[address]   Occupational Therapy Evaluation  Date: 3/16/20  Patient Name: Doris Drew       Room: 0205/0205-01  MRN: [id number]  Account: [de-identified]   : 1950  (73 y.o.) Gender: female     Discharge Recommendations: The patient needs non-skilled assistance after discharge. OT Equipment Recommendations  Equipment Needed: No       Diagnosis: schizophrenia, alzheimer's dementia  Additional Pertinent Hx: HPI 68 y.o. female presents with c/o mental health evaluation. She was brought to the Emergency Department by Daniel Rivas on an application for emergency admission form (pink slip) from her outpatient behavioral health provider with concerns that the patient is unable to to care for her basic physical needs. The patient denies drug use, no attempts at self harm to this point. Past Medical History:  has a past medical history of Hypertension. Past Surgical History:   has a past surgical history that includes  section and Wrist surgery (Right). Restrictions  Other position/activity restrictions: decreased cognition, safety judgement, memory, problem solving     Vitals  Temp: 97.7 °F (36.5 °C)  Pulse: 66  Resp: 14  BP: 127/85  Height: 5' 2\" (157.5 cm)  Weight: 119 lb (54 kg)  BMI (Calculated): 21.8  Oxygen Therapy  SpO2: 100 %  Pulse Oximeter Device Mode: Intermittent  Pulse Oximeter Device Location: Finger  O2 Device: None (Room air)  Skin Protection for O2 Device: N/A  Blood Gas  Performed?: No  Level of Consciousness: Alert    Subjective  Subjective: \"I missed talking to the dieticians so I ended up with some food I didn't want. \"  per nsg, in spite of extra time taken to assist her in placing her meal orders, she does not like some items and then asks for snacks multiple times. nsg notes pt has been spoken to about completing bathing and verbalizes understanding but is not doing it.   They note she has has one shower since admit  and she did not take off Mobility  Supine to Sit: Independent  Sit to Supine: Independent       Balance  Sitting Balance: Independent  Standing Balance: Independent  Standing Balance  Time: 4-5 min  Activity: ambulate in room and on unit, groom at sink  Functional Mobility  Functional - Mobility Device: No device  Assist Level: Independent  Functional Mobility Comments: pt indep amb on unit and in room with no LOB or falls noted by nsg. Bed mobility  Supine to Sit: Independent  Sit to Supine: Independent  Scooting: Independent     Transfers  Stand Step Transfers: Independent  Sit to stand: Independent  Stand to sit: Independent      Assessment  Performance deficits / Impairments: Decreased cognition, Decreased high-level IADLs, Decreased ADL status  Assessment: no skilled therapy indicated, placment for care due to poor cognition is recommended  Decision Making: Medium Complexity  History: schizophrenia, alzheimer's dementia  Exam: 3 performance deficits  Assistance / Modification: mod due to cognition  REQUIRES OT FOLLOW UP: No  Activity Tolerance: Patient Tolerated treatment well                      Plan        Plan  Plan Comment: no skilled therapy indicated, placment for care due to poor cognition is recommended  OT Education  OT Education: OT Role, Plan of Care  Patient Education: encouraged pt cooperate to shower with nsg here.  ed pt re need for going where she will be given care upon discharge instead of home. pt does not think this is needed but was unable to verbalize how she obtains her groceries at home, with extra time and multiple attempts, pt remains silent re this question but answers that she had enough to eat.       OT Equipment Recommendations  Equipment Needed: No  OT Individual Minutes  Time In: 2116  Time Out: 5163  Minutes: 28    Electronically signed by Jose Raul Bernstein OT on 3/16/20 at 5:05 PM EDT

## 2020-03-16 NOTE — PLAN OF CARE
Problem: Altered Mood, Deterioration in Function:  Goal: Ability to perform activities of daily living will improve  Description: Ability to perform activities of daily living will improve  3/16/2020 1055 by WARNER Mares  Outcome: Ongoing   Patient toilets herself independently, but refuses to tend to her ADL's such as showering and oral care. Staff is encouraging the patient and attempting to educate the patient on personal hygiene. The patient is receptive of this education and agrees to think about showering sometime this shift. Staff will continue to show support and encouragement. Problem: Altered Mood, Deterioration in Function:  Goal: Maintenance of adequate nutrition will improve  Description: Maintenance of adequate nutrition will improve  3/16/2020 1055 by WARNER Mares  Outcome: Ongoing   Patient did refuse to order choice meals with dietary today. Dietary staff did personally approach the patient and offer to help the patient order food but the patient ignored the staff and would not respond. Patient was informed that without ordering with dietary staff, that she probably would not receive the meals she would want. Patient verbalizes understanding of meal rules and still refused to order meals. Staff will continue to show support. Problem: Safety:  Goal: Ability to remain free from injury will improve  Description: Ability to remain free from injury will improve  3/16/2020 1055 by WARNER Mares  Outcome: Ongoing   Pt remains free from any injury. Safe environment maintained. Every 15 minute checks for safety continued per unit policy. Will continue to monitor for safety and provide support and reassurance as needed.

## 2020-03-16 NOTE — GROUP NOTE
Group Therapy Note    Date: 3/16/2020    Group Start Time: 0845  Group End Time: 8616  Group Topic: Community Meeting    [facility]    [address], [place]    Pt did not attend [employer] d/t resting in room despite staff invitation to attend. 1:1 talk time offered as alternative to group session, pt declined.

## 2020-03-16 NOTE — PROGRESS NOTES
PROGRESS NOTE  Chart has been reviewed and the patient was interviewed in the common area. There was no changes in mental status from the last note. The patient was tolerating risperidone with no side effects of the medication. The patient denied current thoughts of harming self or others. The patient denied visual or auditory hallucinations. Patient has been taking her medications. She is still needing encouragement by the nursing staff for her ADLs. There was no behavioral problem reported by the nursing staff. The patient was isolative to self and exhibiting poverty of thoughts. The patient refused the dementia lab work. We will continue monitoring for symptoms of depression, psychosis and liver problems to adjust her medications as needed. The discharge plan is pending upon placement. MENTAL STATUS EXAMINATION:    /85   Pulse 66   Temp 97.7 °F (36.5 °C) (Oral)   Resp 14   Ht 5' 2\" (1.575 m)   Wt 119 lb (54 kg)   SpO2 100%   BMI 21.77 kg/m²     MOOD-is dysphoric   Affect-is depressed  Patient feels helpless hopeless and worthless  Psychomotor activity : decreased. APPEARANCE:  Personal hygiene is poor and patient is neglecting his appearance. Patient is somewhat unkempt  PSYCHOSIS:  Denies auditory or visual hallucinations. Denies paranoid delusions. ORIENTATION:  Oriented to time place and person. Recent and remote memory is grossly intact. Intelligence appears to be average  CONCENTRATION:  poor. SPEECH : goal directed , but slow . DIAGNOSIS:    Principal Problem:    Alzheimer's disease ([facility] [address]  Active Problems:    Schizophrenia ([facility] [address]  Resolved Problems:    * No resolved hospital problems.  *      LAB:    No results found for this or any previous visit (from the past 72 hour(s)). TREATMENT PLAN:     risperiDONE  0.5 mg Oral Daily    donepezil  10 mg Oral Nightly    VORTIoxetine  10 mg Oral Daily     acetaminophen, aluminum & magnesium hydroxide-simethicone, benztropine mesylate, hydrOXYzine, magnesium hydroxide, traZODone    Chart was reviewed the patient has been interviewed  Continue the medications as prescribed above  Patient was discussed with the  and the treatment team.   Provided Supportive and insight-oriented psychotherapy psychotherapy  Recommended involvement in Unit milieu  Provided empathic listening, validation and support  Patient acknowledged and mutually decided to continue with current treatment plan  Discharge planning was discussed with the patient and proceed with the discharge plan. Alyson Toth MD        This note was created with the assistance of a speech-recognition program.  Although the intention is to generate a document that actually reflects the content of the visit, no guarantees can be provided that every mistake has been identified and corrected by editing.

## 2020-03-17 PROCEDURE — 6370000000 HC RX 637 (ALT 250 FOR IP): Performed by: PSYCHIATRY & NEUROLOGY

## 2020-03-17 PROCEDURE — 1240000000 HC EMOTIONAL WELLNESS R&B

## 2020-03-17 RX ORDER — RISPERIDONE 1 MG/1
1 TABLET, FILM COATED ORAL DAILY
Status: DISCONTINUED | OUTPATIENT
Start: 2020-03-18 | End: 2020-03-21

## 2020-03-17 RX ADMIN — TRAZODONE HYDROCHLORIDE 50 MG: 50 TABLET ORAL at 21:29

## 2020-03-17 RX ADMIN — DONEPEZIL HYDROCHLORIDE 10 MG: 10 TABLET, FILM COATED ORAL at 21:29

## 2020-03-17 RX ADMIN — HYDROXYZINE HYDROCHLORIDE 25 MG: 25 TABLET, FILM COATED ORAL at 21:29

## 2020-03-17 RX ADMIN — RISPERIDONE 0.5 MG: 1 TABLET ORAL at 08:58

## 2020-03-17 RX ADMIN — VORTIOXETINE 10 MG: 10 TABLET, FILM COATED ORAL at 08:58

## 2020-03-17 ASSESSMENT — PAIN SCALES - GENERAL
PAINLEVEL_OUTOF10: 0
PAINLEVEL_OUTOF10: 0

## 2020-03-17 NOTE — PLAN OF CARE
Problem: Altered Mood, Deterioration in Function:  Goal: Ability to perform activities of daily living will improve  Description: Ability to perform activities of daily living will improve  Outcome: Ongoing  Note: Patient was encouraged to take a shower and brush her teeth. She refused. Problem: Altered Mood, Deterioration in Function:  Goal: Maintenance of adequate nutrition will improve  Description: Maintenance of adequate nutrition will improve  Outcome: Ongoing  Note: Patient needs lots of encouragement to eat her meals. She states \"i'm just not hungry\" but then come to ask for crackers. Problem: Role Relationship:  Goal: Ability to participate appropriately in conversations will improve  Description: Ability to participate appropriately in conversations will improve  Outcome: Ongoing  Note: Patient will not start a conversation but will talk to you if you ask her questions. Problem: Safety:  Goal: Ability to remain free from injury will improve  Description: Ability to remain free from injury will improve  Outcome: Ongoing  Note: Patient is free from injury.

## 2020-03-17 NOTE — GROUP NOTE
Group Therapy Note    Date: 3/17/2020    Group Start Time: 1100  Group End Time: [phone]  Group Topic: Psychoeducation    KELVIN Banerjee, CTRS    Pt did not attend RT skills group d/t resting in room despite staff invitation to attend. 1:1 talk time offered as alternative to group session, pt declined.

## 2020-03-17 NOTE — CARE COORDINATION
Spoke with Dr. David Mccray and he wanted an update on clients progress. Writer called the area office on aging and the officers are working from home due to corona virus concerns. Writer left voicemail with frnady flores inquriing if elida is still doing hospital visits to complete PASS/RR. Writer phoned patients daughter to update her. Patients daughter states that client has been non-adherent in taking her medications and if she is discharged, she will discontinue taking her medications and be re-admitted to the hospital because patients new baseline off of medications cannot maintain stability and independence.

## 2020-03-17 NOTE — GROUP NOTE
Group Therapy Note    Date: 3/17/2020    Group Start Time: 0845  Group End Time: 0900  Group Topic: Community Meeting    KELVIN Henning    Patient refused to attend community meeting/ goals group at [facility] after encouragement from staff. 1:1 talk time provided by staff.        Signature:  Mohan Henning

## 2020-03-17 NOTE — GROUP NOTE
Group Therapy Note    Date: 3/17/2020    Group Start Time: 5591  Group End Time: 0416  Group Topic: Cognitive Skills    KELVIN Clarke    Patient refused to attend cognitive/ social skills group at [phone] after encouragement from staff. 1:1 talk time provided by staff.       Signature:  Jacinda Clarke

## 2020-03-18 PROCEDURE — 6370000000 HC RX 637 (ALT 250 FOR IP): Performed by: PSYCHIATRY & NEUROLOGY

## 2020-03-18 PROCEDURE — 1240000000 HC EMOTIONAL WELLNESS R&B

## 2020-03-18 RX ADMIN — VORTIOXETINE 10 MG: 10 TABLET, FILM COATED ORAL at 09:32

## 2020-03-18 RX ADMIN — DONEPEZIL HYDROCHLORIDE 10 MG: 10 TABLET, FILM COATED ORAL at 19:52

## 2020-03-18 RX ADMIN — RISPERIDONE 1 MG: 1 TABLET ORAL at 09:32

## 2020-03-18 NOTE — CARE COORDINATION
Writer spoke to Dr. Amanda Fu who reported, due to the COVID-19, it would be ideal if patient could be discharged asap. Writer called pt's daughter/guardian and left voicemail regarding this. Writer's daughter/guardian reported that she would prefer patient be placed in a nursing home, as she believes that she will be re-admitted. Writer received voicemail from Anyi Andrew regarding evaluating pt for the [employer]. Anyi Andrew reported that she would not visit pt in person, due to visitor restrictions, and would like a call back at [phone] to speak with social work and patient.

## 2020-03-18 NOTE — GROUP NOTE
Group Therapy Note    Date: 3/18/2020    Group Start Time: 1000  Group End Time: 56  Group Topic: Psychotherapy    [facility]    MARILYNN Alcazar LSW        Group Therapy Note    patient refused to attend psychotherapy group at 10:00am after encouragement from staff.        Signature:  MARILYNN Alcazar LSW

## 2020-03-18 NOTE — PLAN OF CARE
Problem: Altered Mood, Deterioration in Function:  Goal: Ability to perform activities of daily living will improve  Description: Ability to perform activities of daily living will improve  Outcome: Ongoing   Patient encouraged to tend to personal hygiene, hygiene, patient refusing to tend to personal needs  Problem: Role Relationship:  Goal: Ability to participate appropriately in conversations will improve  Description: Ability to participate appropriately in conversations will improve  Outcome: Ongoing    Patient will answer questions with little response,  patient at times will be irritable with staff. Patient remains aloof of peers while in dayroom, unwilling to attend group programming. Patient pacing veliz most of day. Problem: Safety:  Goal: Ability to remain free from injury will improve  Description: Ability to remain free from injury will improve  Outcome: Ongoing    Patient remains free of falls,  patient safety maintained.   Every 15 minute checks and frequent spontaneous checks performed per hospital policy

## 2020-03-18 NOTE — PLAN OF CARE
Problem: Altered Mood, Deterioration in Function:  Goal: Ability to perform activities of daily living will improve  Description: Ability to perform activities of daily living will improve  3/17/2020 2108 by Emelia Morales RN  Outcome: Ongoing  Note: Patient has been calm, controlled and med complaint. Accepting of 1:1 talk time with staff. Problem: Altered Mood, Deterioration in Function:  Goal: Maintenance of adequate nutrition will improve  Description: Maintenance of adequate nutrition will improve  3/17/2020 2108 by Emelia Morales RN  Outcome: Ongoing  Note: Patient ate 100% of snack  Problem: Role Relationship:  Goal: Ability to participate appropriately in conversations will improve  Description: Ability to participate appropriately in conversations will improve  3/17/2020 2108 by Emelia Morales RN  Outcome: Ongoing  Note: 1:1 with pt x ten minutes. Pt encouraged to attend unit programming and interact with peers and staff. Pt also encouraged to tend to hygiene and ADLs. Pt encouraged to discuss feelings with staff and feedback and reassurance provided. Problem: Safety:  Goal: Ability to remain free from injury will improve  Description: Ability to remain free from injury will improve  3/17/2020 2108 by Emelia Morales RN  Outcome: Ongoing  Note: Patient denies suicidal and homicidal thoughts. Patient denies auditory and visual hallucinations. Patient is taking meds and eating well. No self harm noted this shift. Patient agrees to seek staff out if negative thoughts arise. Will continue to monitor Q15 minute and intermittently.

## 2020-03-18 NOTE — GROUP NOTE
Group Therapy Note    Date: 3/18/2020    Group Start Time: 1100  Group End Time: 7617  Group Topic: Healthy Living/Wellness    [address]        Group Therapy Note    Attendees: 5    Pt did not attend Therapeutic Recreation d/t resting in room despite staff invitation to attend. 1:1 talk time offered as alternative to group session, pt declined.

## 2020-03-18 NOTE — PROGRESS NOTES
PROGRESS NOTE  The chart has been reviewed and the patient was interviewed in the common area. The case has been discussed with the  to follow-up with the placement plan. The patient was guarded with a flat affect during the evaluation. She denied visual or auditory hallucinations. She exhibited paranoid thoughts. The patient asked the writer about the medication she is taking. The writer explained to the patient the medications, the side effects and the benefits of these medications. The patient reported that she does not need to be on medication. She is still exhibiting poor insight and impaired judgment. The patient insisted that the date of today is March 24. When the writer showed the patient the data on this telephone screen, she still insisted that it was wrong and she is correct. The patient was concrete in her thinking. She also exhibited symptoms of memory problems. The patient has a diagnosis of dementia. The patient's need to be placed on extended care facility because she cannot take care of her basic needs. However, the discharge plan is pending upon the process of PASS-R which was delayed due to the coronavirus restrictions. We will continue monitoring for symptoms of mood disorder, behavioral problem. And to adjust her medications as needed. The patient is still encouraged by the nursing staff to be compliant with her medications.                                                                MENTAL STATUS EXAMINATION:    BP (!) 134/91   Pulse 79   Temp 98.4 °F (36.9 °C) (Oral)   Resp 14   Ht 5' 2\" (1.575 m)   Wt 119 lb (54 kg)   SpO2 100%   BMI 21.77 kg/m²     MOOD-is dysphoric   Affect-is depressed  Patient denied helpless hopeless and worthless  Psychomotor activity : decreased with psychomotor retardation. APPEARANCE:  Personal hygiene is poor and patient is neglecting his appearance. Patient is somewhat unkempt  PSYCHOSIS:  Denies auditory or visual hallucinations. She exhibited  paranoid delusions. ORIENTATION:  Oriented to time place and person. Recent and remote memory is impaired. Intelligence appears to be average  CONCENTRATION:  poor. SPEECH : goal directed , but slow . DIAGNOSIS:    Principal Problem:    Alzheimer's disease ([facility] [address]  Active Problems:    Schizophrenia ([facility] [address]  Resolved Problems:    * No resolved hospital problems. *      LAB:    No results found for this or any previous visit (from the past 72 hour(s)). TREATMENT PLAN:     risperiDONE  0.5 mg Oral Daily    donepezil  10 mg Oral Nightly    VORTIoxetine  10 mg Oral Daily     acetaminophen, aluminum & magnesium hydroxide-simethicone, benztropine mesylate, hydrOXYzine, magnesium hydroxide, traZODone    Chart was reviewed and the patient has been interviewed  Continue the medications as prescribed above and increase Risperdal to 1 mg p.o. twice daily for psychosis  Patient was discussed with the  and the treatment team.   Provided Supportive and insight-oriented psychotherapy psychotherapy  Recommended involvement in Unit milieu  Provided empathic listening, validation and support  Patient acknowledged and mutually decided to continue with current treatment plan  Discharge planning was discussed with the patient. Murray Hinojosa MD        This note was created with the assistance of a speech-recognition program.  Although the intention is to generate a document that actually reflects the content of the visit, no guarantees can be provided that every mistake has been identified and corrected by editing.

## 2020-03-18 NOTE — GROUP NOTE
Group Therapy Note    Date: 3/18/2020    Group Start Time: 0900  Group End Time: 0915  Group Topic: Shahzad [address] [facility], [facility] [place]note 2][address] [place] [address]        Group Therapy Note    Attendees: 5  Pt did not attend [employer] d/t resting in room despite staff invitation to attend. 1:1 talk time offered as alternative to group session, pt declined.

## 2020-03-19 PROCEDURE — 1240000000 HC EMOTIONAL WELLNESS R&B

## 2020-03-19 PROCEDURE — 6370000000 HC RX 637 (ALT 250 FOR IP): Performed by: PSYCHIATRY & NEUROLOGY

## 2020-03-19 RX ORDER — MIRTAZAPINE 15 MG/1
7.5 TABLET, FILM COATED ORAL NIGHTLY
Status: DISCONTINUED | OUTPATIENT
Start: 2020-03-19 | End: 2020-03-21

## 2020-03-19 RX ADMIN — MIRTAZAPINE 7.5 MG: 15 TABLET, FILM COATED ORAL at 19:52

## 2020-03-19 RX ADMIN — DONEPEZIL HYDROCHLORIDE 10 MG: 10 TABLET, FILM COATED ORAL at 19:53

## 2020-03-19 RX ADMIN — RISPERIDONE 1 MG: 1 TABLET ORAL at 10:19

## 2020-03-19 RX ADMIN — VORTIOXETINE 10 MG: 10 TABLET, FILM COATED ORAL at 10:19

## 2020-03-19 NOTE — PLAN OF CARE
Problem: Altered Mood, Deterioration in Function:  Goal: Ability to perform activities of daily living will improve  Description: Ability to perform activities of daily living will improve  Outcome: Ongoing    Patient needs set-up and encouragement to care for personal hygiene. Patient was willing to shower today,  staff assisted patient and clothes are being washed      Problem: Altered Mood, Deterioration in Function:  Goal: Maintenance of adequate nutrition will improve  Description: Maintenance of adequate nutrition will improve  Outcome: Ongoing    Patient eats less than 25% of meal at present time,  staff attempted to set up patient tray, patient refused to sit at table and eat. Finger foods encouraged for patient     Problem: Role Relationship:  Goal: Ability to participate appropriately in conversations will improve  Description: Ability to participate appropriately in conversations will improve  Outcome: Ongoing    Patient will answer questions with a yes or no statement, very little elaboration with questions given. Patient remains aloof of peers, refusing group activity offered on unit. Pacing continuously through day and night. New medications ordered to help with sleep and appetite     Problem: Safety:  Goal: Ability to remain free from injury will improve  Description: Ability to remain free from injury will improve  Outcome: Ongoing    Pt remains free of personal injury and verbalizes understanding of use of call light. Pt wearing non skid footwear and encouraged to seek out staff for any assistance needed.

## 2020-03-19 NOTE — CARE COORDINATION
Call from Lupe Stanley at [facility] to gather needed information to complete determination for nursing facility (they are currently not allowed to enter hospitals to complete face to face). Social work provided needed information regarding ADLs, PT and OT consult evaluations. Lupe Stanley states she will submit her recommendation and social work will be notified of results.

## 2020-03-19 NOTE — PROGRESS NOTES
PROGRESS NOTE  Has been reviewed and the patient was interviewed at the bedside. The case has been discussed with the nursing staff and the . There was no changes in the mental status from the last note. The patient has been taking her medications with the encouragement of the nursing staff. The patient still not taking care of her ADLs, showering or brushing her teeth. The patient answers to the questions in a brief statements \"I am fine\" to most of the questions. The patient denied current thoughts of harming self or others. She denied visual or auditory hallucinations. The patient has been tolerating risperidone. The discharge plan has been discussed with the nursing staff and the . The patient is representing risk of encountering infections especially with the ongoing outbreak of the coronavirus. However, the discharge plan is pending upon placement. MENTAL STATUS EXAMINATION:    /80   Pulse 65   Temp 97.9 °F (36.6 °C) (Oral)   Resp 14   Ht 5' 2\" (1.575 m)   Wt 119 lb (54 kg)   SpO2 100%   BMI 21.77 kg/m²     MOOD-is dysphoric   Affect-is depressed  Patient denied helpless hopeless and worthless  Psychomotor activity : decreased with psychomotor retardation. APPEARANCE:  Personal hygiene is poor and patient is neglecting his appearance. Patient is somewhat unkempt  PSYCHOSIS:  Denies auditory or visual hallucinations. She exhibited  paranoid delusions. ORIENTATION:  Oriented to time place and person. Recent and remote memory is impaired. Intelligence appears to be average  CONCENTRATION:  poor. SPEECH : goal directed , but slow .     DIAGNOSIS:    Principal Problem:    Alzheimer's disease ([facility] [address]  Active Problems:    Schizophrenia ([facility] [address]  Resolved

## 2020-03-19 NOTE — PROGRESS NOTES
PROGRESS NOTE  The chart has been reviewed and the patient was interviewed in the common area. The patient was walking in the hallway when the writer approached her. The patient was walking and a shuffling gait. She was oriented to time place person. She was oriented to the situation. The patient reported the date is March 26, 2020. When the writer corrected her she was accepting and was not argumentative. The patient was guarded and seclusive to self. The patient is still needed encouragement from the nursing staff to do her ADLs. The staff reported that the patient was not showering or brushing her teeth. However, she was taking her medications as prescribed. The nurses reported that the patient was not sleeping well. She also exhibited poor appetite. The patient agreed to start on Remeron 7.5 mg p.o. at bedtime for symptoms of depression, and anxiety and to help with her sleep. She denied visual or auditory hallucinations. The patient has been tolerating risperidone. The discharge plan has been discussed with the nursing staff and the . The patient is representing risk of encountering infections especially with the ongoing outbreak of the coronavirus. However, the discharge plan is pending upon placement. MENTAL STATUS EXAMINATION:    /76   Pulse 61   Temp 98.5 °F (36.9 °C) (Oral)   Resp 14   Ht 5' 2\" (1.575 m)   Wt 119 lb (54 kg)   SpO2 97%   BMI 21.77 kg/m²     MOOD-is dysphoric   Affect-is depressed  Patient denied helpless hopeless and worthless  Psychomotor activity : decreased with psychomotor retardation.   APPEARANCE:  Personal hygiene is poor and patient is neglecting his appearance.  Patient is somewhat unkempt  PSYCHOSIS:  Denies auditory or visual

## 2020-03-20 PROCEDURE — 1240000000 HC EMOTIONAL WELLNESS R&B

## 2020-03-20 PROCEDURE — 6370000000 HC RX 637 (ALT 250 FOR IP): Performed by: PSYCHIATRY & NEUROLOGY

## 2020-03-20 NOTE — CARE COORDINATION
SOCIAL SERVICE NOTE: PHU has several conversations on this date with PT's daughter and legal guardian Chary Alicea [phone]. Shikha Woodruff is inquiring if PT is discharged home to stay with daughter that the [facility] will not be valid. PT daughter lives in [place], but is currently in town and is planning on taking PT home and then having her transferred to a nursing home when the threat of Coronovirus is lessened. Phu telphones Isidro Cisneros in preadmission review at [phone] ext [phone] Fajardost Delgado at the [facility] office on aging on this date who verifies that the [facility] for the PT will still be valid for 180 days if pt is discharged from hospital to her home and then enters a nursing facility. Phu telephones PT's anniehter and gives her this information. Pt daughter requests that information be sent to the following nursing facilkities. PHU informed PT daughter that nursing facilities will not be able to be contacted until Monday 3/23/2020, because that do notlikely accept admissions on the weekends and that many nursing homes are not accepting new clients's due to the [address]. PHU completed release of information forms and e-mailed them to PT daughter Sharon Lama at e-mail address [email or web address]. Copies of the release forms are in PT chart. Release forms were completed for [facility], [address] [place] [address], [address] of [facility] due to [employer] daughter's request on this date. The PASSAR approved resukts were received today by PHU and a copy was placed in PT paper chart.

## 2020-03-20 NOTE — GROUP NOTE
Group Therapy Note    Date: 3/20/2020    Group Start Time: 1330  Group End Time: 7050  Group Topic: Recovery    [facility]    MARILYNN Ovalle, JOSE        Group Therapy Note    Attendees: 8/14    patient refused to attend Recovery group at 1330 after encouragement from staff.              Signature:  MARILYNN Ovalle, JOSE

## 2020-03-20 NOTE — GROUP NOTE
Group Therapy Note    Date: 3/20/2020    Group Start Time: 1430  Group End Time: 1879  Group Topic: Healthy Living/Wellness    [facility] KEMAR Duarte    Patient refused to attend exercise/ wellness group at [phone] after encouragement from staff. 1:1 talk time provided by staff.       Signature:  Jeramy Duarte

## 2020-03-21 PROBLEM — E44.0 MODERATE MALNUTRITION (HCC): Status: ACTIVE | Noted: 2020-03-21

## 2020-03-21 PROCEDURE — 1240000000 HC EMOTIONAL WELLNESS R&B

## 2020-03-21 RX ORDER — RISPERIDONE 1 MG/1
1 TABLET, ORALLY DISINTEGRATING ORAL DAILY
Status: DISCONTINUED | OUTPATIENT
Start: 2020-03-21 | End: 2020-03-23 | Stop reason: HOSPADM

## 2020-03-21 RX ORDER — MIRTAZAPINE 15 MG/1
15 TABLET, ORALLY DISINTEGRATING ORAL NIGHTLY
Status: DISCONTINUED | OUTPATIENT
Start: 2020-03-21 | End: 2020-03-23 | Stop reason: HOSPADM

## 2020-03-21 RX ORDER — RIVASTIGMINE 9.5 MG/24H
1 PATCH, EXTENDED RELEASE TRANSDERMAL NIGHTLY
Status: DISCONTINUED | OUTPATIENT
Start: 2020-03-21 | End: 2020-03-23 | Stop reason: HOSPADM

## 2020-03-21 ASSESSMENT — PAIN SCALES - GENERAL: PAINLEVEL_OUTOF10: 0

## 2020-03-21 NOTE — PLAN OF CARE
Problem: Altered Mood, Deterioration in Function:  Goal: Ability to perform activities of daily living will improve  Description: Ability to perform activities of daily living will improve  Outcome: Ongoing  Note: Patient refused assistance or encouragement of attention to ADLs this shift. Goal: Maintenance of adequate nutrition will improve  Description: Maintenance of adequate nutrition will improve  Outcome: Ongoing  Note: Patient refused snack. Problem: Role Relationship:  Goal: Ability to participate appropriately in conversations will improve  Description: Ability to participate appropriately in conversations will improve  Note: Patient refused 1:1 or conversation with writer and other staff this shift. Problem: Safety:  Goal: Ability to remain free from injury will improve  Description: Ability to remain free from injury will improve  Note: Patient remains free from falls at Q 15 min safety checks.

## 2020-03-21 NOTE — PLAN OF CARE
Nutrition Problem:  Moderate malnutrition  Intervention: Food and/or Nutrient Delivery: Continue current diet, Start ONS(Attempt to provide foods best tolerated within  constraints)  Nutritional Goals: PO intake % of meals and supplements

## 2020-03-21 NOTE — PROGRESS NOTES
Nutrition Assessment    Type and Reason for Visit: Initial(Request for Finger Foods; Admission Screen indicates probable wt loss)    Nutrition Recommendations: Continue current diet; attempt to provide foods best tolerated within  constraints. Provide Ensure Enlive twice daily. Nutrition Assessment: Nurse requested finger foods for pt due to pt's difficulty sitting still for meals. Dietary staff indicates pt has specific food preferences, some of which require utensils. Will attempt to provide foods best tolerated within Food and Nutrition Services limitations. Possble 9-10 lb wt loss was reported on admission; pt apparently has regained 3 lb. However, Ensure Enlive also be offered x 2 daily to further supplement intake. Remeron was started on 3-19. Malnutrition Assessment:  · Malnutrition Status: Meets the criteria for moderate malnutrition(Met criteria at time of admission; appears to be improving somewhat)  · Context: Acute illness or injury  · Findings of the 6 clinical characteristics of malnutrition (Minimum of 2 out of 6 clinical characteristics is required to make the diagnosis of moderate or severe Protein Calorie Malnutrition based on AND/ASPEN Guidelines):  1. Energy Intake-Less than or equal to 75% of estimated energy requirement, Greater than or equal to 7 days(Prior to admission)    2. Weight Loss-5% loss or greater, in 1 week(estimated, prior to admission)  3. Fat Loss-Unable to assess,    4. Muscle Loss-Unable to assess,    5. Fluid Accumulation-No significant fluid accumulation, Extremities  6.  Strength-Not measured    Nutrition Risk Level: High    Nutrient Needs:  · Estimated Daily Total Kcal: 3170-7305 based on [place]-St. Jeor with 1.3-1.4 factor using actual wt of 54 kg  · Estimated Daily Protein (g): 54-65 based on 1-1.2 gm protein per kg of actual wt (54kg)    Nutrition Diagnosis:   · Problem:  Moderate malnutrition  · Etiology: related to Psychological cause/life stress     Signs and symptoms:  as evidenced by Weight loss greater than or equal to 5% in 1 month, Diet history of poor intake    Objective Information:  · Current Nutrition Therapies:  · Oral Diet Orders: General   · Oral Diet intake: Unable to assess(Nurse unsure. 1 meal recorded at %; physician indicates appetite was poor 3-19)  · Anthropometric Measures:  · Ht: 5' 2\" (157.5 cm)   · Current Body Wt: 119 lb 0.8 oz (54 kg)  · Admission Body Wt: 115 lb 15.4 oz (52.6 kg)(2-28)  · Usual Body Wt: 125 lb (56.7 kg)(stated per daughter)  · % Weight Change:  ,  7% wt loss x 1 wk prior to admission. With recent wt gain, loss is reduced to 5% over the past month. · Ideal Body Wt: 110 lb (49.9 kg), % Ideal Body 108%  · BMI Classification: BMI 18.5 - 24.9 Normal Weight    Nutrition Interventions:   Continue current diet, Start ONS(Attempt to provide foods best tolerated within  constraints)  Continued Inpatient Monitoring, Education not appropriate at this time    Nutrition Evaluation:   · Evaluation: Goals set   · Goals: PO intake % of meals and supplements    · Monitoring: Nutrition Progression, Meal Intake, Supplement Intake, Diet Tolerance, Skin Integrity, Mental Status/Confusion, Weight    Romi Blankenship R.D., L.KEMAR.   Phone: [phone]

## 2020-03-21 NOTE — GROUP NOTE
Group Therapy Note    Date: 3/19/2020    Group Start Time: 2050  Group End Time: 2100  Group Topic: Relaxation    [facility]    Nayeli Gaines, RN          Group Therapy Note:     Pt refused to attend Relaxation group this evening after encouragement from staff. 1:1 talk time offered but pt declined. Will continue to encourage patient to attend unit group programming.         Signature:  Bulmaro Sosa RN

## 2020-03-21 NOTE — GROUP NOTE
Group Therapy Note    Date: 3/21/2020    Group Start Time: 0900  Group End Time: 6687  Group Topic: Community Meeting    [address] [facility] KEMAR [place]        Group Therapy Note    Attendees: 10/22       Pt did not participate in Community Meeting/Goals Group at 900am when encouraged by RT due to resting in room. Pt was offered talk time as an alternative to group but declined.       Discipline Responsible: Psychoeducational Specialist      Signature:  Lo Gardiner

## 2020-03-21 NOTE — GROUP NOTE
Group Therapy Note    Date: 3/21/2020    Group Start Time: 1100  Group End Time: 1130  Group Topic: Healthy Living/Wellness    Patient refused to attend Wellness group at 1100 after encouragement from staff. 1:1 talk time offered as alternative to group session.       Signature:  Reba Alvarez RN

## 2020-03-21 NOTE — GROUP NOTE
Group Therapy Note    Date: 3/21/2020    Group Start Time: 8164  Group End Time: 1051  Group Topic: Cognitive Skills    [facility]    Pamela Ayoub, CTRS        Group Therapy Note    Attendees: 7/19         Pt did not participate in Cognitive Skills Group at [phone] when encouraged by RT due to resting in room. Pt was offered talk time as an alternative to group but declined.          Discipline Responsible: Psychoeducational Specialist        Signature:  Flakito Barros

## 2020-03-22 PROCEDURE — 1240000000 HC EMOTIONAL WELLNESS R&B

## 2020-03-22 PROCEDURE — 6370000000 HC RX 637 (ALT 250 FOR IP): Performed by: PSYCHIATRY & NEUROLOGY

## 2020-03-22 PROCEDURE — 6360000002 HC RX W HCPCS: Performed by: PSYCHIATRY & NEUROLOGY

## 2020-03-22 RX ORDER — RISPERIDONE 1 MG/1
1 TABLET, ORALLY DISINTEGRATING ORAL DAILY
Qty: 60 TABLET | Refills: 3 | Status: SHIPPED | OUTPATIENT
Start: 2020-03-23

## 2020-03-22 RX ORDER — MIRTAZAPINE 15 MG/1
15 TABLET, ORALLY DISINTEGRATING ORAL NIGHTLY
Qty: 30 TABLET | Refills: 3 | Status: SHIPPED | OUTPATIENT
Start: 2020-03-23

## 2020-03-22 RX ORDER — RIVASTIGMINE 9.5 MG/24H
1 PATCH, EXTENDED RELEASE TRANSDERMAL NIGHTLY
Qty: 30 PATCH | Refills: 3 | Status: SHIPPED | OUTPATIENT
Start: 2020-03-23

## 2020-03-22 RX ADMIN — RISPERIDONE 25 MG: KIT at 11:36

## 2020-03-22 RX ADMIN — MIRTAZAPINE 15 MG: 15 TABLET, ORALLY DISINTEGRATING ORAL at 20:56

## 2020-03-22 NOTE — PROGRESS NOTES
PROGRESS NOTE  Heart has been reviewed and the patient was interviewed at the bedside. The patient was oriented to time place and person. However, she was unable to tell today's date. The patient was able to tell the current president of [facility]. The patient was able to memorize her daughter's phone number. The patient denied current thoughts of harming self or others. She denied visual or auditory hallucinations. She was a sluggish in her movement with shuffling gait. The patient exhibited thought blocking. But there was no evidence that she was responding to internal stimuli. The patient exhibited flat affect with minimal expression of emotions. The patient denies side effects of medications. The case has been discussed with the nursing staff and the . The writer was able to call the patient's guardian and to discuss in length the progress of the case and the discharge plan. All the questions were answered. The patient and the guardian agreed to change the medication from Risperdal to Risperdal M tab, from Remeron to Remeron SolTab and from Aricept to Exelon patch for better compliance. The patient agreed to receive the first shot of Risperdal Consta 25 mg every 2 weeks. We will proceed with the discharge plan by Monday. MENTAL STATUS EXAMINATION:    BP (!) 140/76   Pulse 62   Temp 97.5 °F (36.4 °C) (Oral)   Resp 14   Ht 5' 2\" (1.575 m)   Wt 119 lb (54 kg)   SpO2 97%   BMI 21.77 kg/m²     MOOD-is dysphoric   Affect-is depressed  Patient denied helpless hopeless and worthless  Psychomotor activity : decreased with psychomotor retardation.   APPEARANCE:  Personal hygiene is poor and patient is neglecting his appearance.  Patient is somewhat unkempt  PSYCHOSIS:  Denies

## 2020-03-22 NOTE — GROUP NOTE
Group Therapy Note    Date: 3/22/2020    Group Start Time: 1100  Group End Time: 6306  Group Topic: Cognitive Skills    [facility]    Jackelyn Shirley        Group Therapy Note    Attendees: 9/19      patient refused to attend cognitive skills group at 1100 after encouragement from staff. 1:1 talk time provided as alternative to group session.     Signature:  Jackelyn Shirley

## 2020-03-22 NOTE — GROUP NOTE
Group Therapy Note    Date: 3/22/2020    Group Start Time: 0900  Group End Time: 2359  Group Topic: Community Meeting    KELVIN [place], [place]        Group Therapy Note    Attendees: 6/19         Pt did not participate in Community Meeting/Goals Group at 900am when encouraged by RT due to resting in room. Pt was offered talk time as an alternative to group but declined.         Discipline Responsible: Psychoeducational Specialist        Signature:  Sera Alvarado

## 2020-03-22 NOTE — PLAN OF CARE
Problem: Altered Mood, Deterioration in Function:  Goal: Ability to perform activities of daily living will improve  Description: Ability to perform activities of daily living will improve  3/21/2020 2129 by Charito Helm RN  Outcome: Ongoing  Note: Patient is encouraged to shower and care for herself. Patient refuses at this time even after staff encouragement. Patient appears disheveled. Problem: Altered Mood, Deterioration in Function:  Goal: Able to verbalize reality based thinking  Description: Able to verbalize reality based thinking  3/21/2020 2129 by Charito Helm RN  Outcome: Ongoing  Note: Patient is oriented to person. Patient is evasive. She paces the unit at times. When she is not in the day area patient is isolative to her room. Problem: Altered Mood, Deterioration in Function:  Goal: Participates in care planning  Description: Participates in care planning  3/21/2020 2129 by Charito Helm RN  Outcome: Ongoing  Note: When staff tries talking to patient she says \"get out\". Patient refused her scheduled HS medications. She also refused to allow her vitals to be taken.

## 2020-03-23 VITALS
HEART RATE: 69 BPM | HEIGHT: 62 IN | DIASTOLIC BLOOD PRESSURE: 81 MMHG | SYSTOLIC BLOOD PRESSURE: 138 MMHG | RESPIRATION RATE: 14 BRPM | TEMPERATURE: 97.5 F | OXYGEN SATURATION: 97 % | WEIGHT: 119 LBS | BODY MASS INDEX: 21.9 KG/M2

## 2020-03-23 PROCEDURE — 6370000000 HC RX 637 (ALT 250 FOR IP): Performed by: PSYCHIATRY & NEUROLOGY

## 2020-03-23 RX ADMIN — RISPERIDONE 1 MG: 1 TABLET, ORALLY DISINTEGRATING ORAL at 09:25

## 2020-03-23 NOTE — BH NOTE
[facility]  Discharge Note    Pt discharged with followings belongings:   Dentures: None  Vision - Corrective Lenses: Glasses  Hearing Aid: None  Jewelry: None  Body Piercings Removed: N/A  Clothing: Footwear, Jacket / coat, Pants, Shirt, Undergarments (Comment)  Were All Patient Medications Collected?: Not Applicable  Other Valuables: Other (Comment)(2 keys on [facility], insurance card)   Valuables sent home with patient and guardian. Valuables retrieved from safe, Security envelope number:  [id number] and returned to patient. Patient education on aftercare instructions: Yes-Guardian  Information faxed to [facility] ([place], [place]) by Yonatan Rasmussen RN Patient verbalize understanding of AVS:  Yes-Guardian. Status EXAM upon discharge: Alert with confusion. Selectively non-verbal.   Status and Exam  Normal: No  Facial Expression: Flat  Affect: Appropriate  Level of Consciousness: Alert  Mood:Normal: No  Mood: Empty, Depressed  Motor Activity:Normal: Yes  Motor Activity: Decreased  Interview Behavior: Cooperative  Preception: [place] to Person  Attention:Normal: No  Attention: Distractible  Thought Processes: Blocking  Thought Content:Normal: No  Thought Content: Preoccupations, Poverty of Content  Hallucinations: None  Delusions: No  Delusions: Obsessions  Memory:Normal: No  Memory: Poor Recent, Poor Remote  Insight and Judgment: No  Insight and Judgment: Poor Judgment, Poor Insight  Present Suicidal Ideation: No  Present Homicidal Ideation: No      Metabolic Screening:    Lab Results   Component Value Date    LABA1C 5.4 04/17/2016       Lab Results   Component Value Date    CHOL 145 04/17/2016     Lab Results   Component Value Date    TRIG 57 04/17/2016     Lab Results   Component Value Date    HDL 46 04/17/2016     No components found for: LDLCAL  No results found for: LABVLDL    Ms. Cristina Live was discharged to home with her daughter/guardian BODØ. BODØ transported her.  Some belongings were unaccounted for upon
DISCHARGE PLANNING UPDATE  - Writer sends all required documents for admission via fax to the following admission departments:  1. [facility] - Phone [phone] and Fax [phone]  2. [facility] - Phone [phone] and Fax [phone]  3. [facility] - Phone: [phone] and Fax [phone]  4. [facility] - Phone: [phone] and Fax [phone]    - Writer speaks with Angelique Rayo at [facility] \[facility]\"" and completed a referral.  Vahid Burleson also sends all requested documents for admission through the ED to fax [phone] and provides client's daughter and guardian the referral information.
Group Therapy Note     Date: 3/21/2020  Group Start Time: 1600    Group Topic:     KELVIN [facility] D     Patient refused to attend nursing wellness group after encouragement from staff. 1:1 talk time offered but refused.      Discipline Responsible: Nursing , group completed by [facility] LPN
PRN note:    PRN atarax given per orders. Patient reports anxiety and restlessness. Will continue to monitor.
Patient decline invitation to attend group due to resting in room, 1:1 talk time offered but declined.
Patient did not attend wrap up group at 2030 despite staff encouragement. Patient was offered 1:1 interaction instead of group but patient declined.
Patient did not attend wrap up group at 2030 despite staff encouragement. Patient was offered 1:1 interaction instead of group but patient declined.
Patient participated in open rec group starting at 1430.
Patient refused wellness/activity group,  1:1 group offered to patient; patient refused
Patient's guardian Maura Crowell will be picking up patient for her discharge on Monday 3/23/2020. Guardian signed consent for injectable medication to be administered, and Dr Frank Stein ordered 25mg intramuscular Risperdal Consta to be given on 3/22/2020.
Pt did not participate in open recreation group at 1430 due to resting in room.
Pt did not participate in open recreation group due to refusal despite encouragement.
Pt refused to take night time medications despite several attempts. Pt states \"The doctor told me last week not to take the medications anymore. \"  Staff attempted to explain the importance of compliance with treatment regimen, but pt got irritated and said \"Get out of here! \"
Spoke with Patients daughter Hugo Langford who is guardian. Daughter is continuing to complain that she has not heard from the doctor. Expressed that she may not that the doctor communicates with family through social work. Guardian has voice raised with rapid pressured speech. States \"Well she will just have to stay with you indefinitely! He can't discharge her if there is no home care set up. It violates his hippocratic oath. \"  Expressed that the decision of home health care is hers to make and if she can not find an affordable option she may have to consider patient living with family. Non receptive to this input. Spoke with patient who states she would like to live with her son if she can't return home. Encouraged patient to work on her hygiene and self care. Patient receptive to this.  Marshall Corrales RN
Writer has reviewed all LPN documentation.
Writer has reviewed all LPN documentation.
Writer spoke with patient's daughter Raj Luciano who is legal guardian for fifteen minutes. Daughter is continuing to complain that she has not heard from Dr. Compa Mckeon. Raj Luciano then stated \"Well he did try to call me but I missed the phone call because I was in the shower. \" Raj Luciano states she then later called to check on her mom and was told the physician was unable to leave a voicemail due to her voicemail being full. Raj Luciano reports she has cleared out her voicemail since that day but never received another call. Raj Luciano asked several questions about her mothers current behaviors; questions were answered to writer's best ability. Raj Luciano was very understanding of her mother's current state. Raj Luciano did begin to raise her voice and use rapid pressured speech stating \" I don't understand shouldn't her doctor call back, I mean if a patient had cancer the doctor would be talking to the family this is the same thing why is he not calling me back? \" Varinder Moody was very sympathetic to her concerns and reassured Raj Luciano that she would let Dr. Compa Mckeon know as soon as she seen him. Family meeting was also offered to Raj Luciano, who stated she did not need that because she can just call social work or the nurse's station any time and get the information she needs. Raj Luciano was unhappy with the information that the writer would let Dr. Compa Mckeon know and that she could not speak with him immediately.
patient refused to attend wellness group at 1100 after encouragement from staff.   1:1 talk time provided as alternative to group session
patient refused to attend wellness group at 1600 after encouragement from staff.   1:1 talk time provided as alternative to group session
file   Other Topics Concern    Not on file   Social History Narrative    Not on file       /77   Pulse 80   Temp 98.3 °F (36.8 °C) (Oral)   Resp 14   Ht 5' 2\" (1.575 m)   Wt 116 lb (52.6 kg)   SpO2 97%   BMI 21.22 kg/m²   MENTAL STATUS EXAMINATION:     MOOD-is dysphoric   Affect-is depressed  Patient feels helpless hopeless and worthless  Psychomotor activity : Restlessness. APPEARANCE:  Personal hygiene is poor and patient is neglecting his appearance. Patient is somewhat unkempt  PSYCHOSIS:  Denies auditory or visual hallucinations. She exhibited paranoid delusions and obsessive thinking. ORIENTATION:  Oriented to time place and person. Recent and remote memory is grossly intact. Intelligence appears to be average  CONCENTRATION:  poor. SPEECH : goal directed , but slow . Length of stay : 5-7 days      Strengths:  The patient has a guardian and supportive family  Patient has no history of alcohol or substance use    Weaknesses:  Patient has history of noncompliance with her medications  Patient has been age-related memory problem        Diagnosis: Principal Problem:    Schizophrenia ([facility]  Resolved Problems:    * No resolved hospital problems.  *         Treatment plan:      lurasidone  20 mg Oral Daily    VORTIoxetine  5 mg Oral Daily    donepezil  5 mg Oral Nightly     acetaminophen, aluminum & magnesium hydroxide-simethicone, benztropine mesylate, hydrOXYzine, magnesium hydroxide, traZODone    Chart was reviewed and the patient has been interviewed  Continue the medications as prescribed above to be titrated as tolerated by the patient  Patient was discussed with the  and the treatment team.   Provided Supportive and insight-oriented psychotherapy  Provided empathic listening, validation and support  Provided support & education of purpose, risks vs. benefits and side effects of treatment with psychotropics  Provided support and education about risk of not receiving

## 2020-03-23 NOTE — GROUP NOTE
Group Therapy Note    Date: 3/23/2020    Group Start Time: 1100  Group End Time: 5360  Group Topic: Recreational    STMARTHA B[facility] D    Jacobo Day    patient refused to attend recreational therapy group at 1100 after encouragement from staff.   1:1 talk time provided as alternative to group session       Signature:  Gerard Cullen

## 2020-03-23 NOTE — GROUP NOTE
Group Therapy Note    Date: 3/23/2020    Group Start Time: 0900  Group End Time: 8843  Group Topic: Community Meeting    KELVIN I D    [address] [facility]  Day    patient refused to attend community meeting group at 0900 after encouragement from staff.   1:1 talk time provided as alternative to group session       Signature:  Jackelyn Duane

## 2020-03-23 NOTE — PLAN OF CARE
Problem: Altered Mood, Deterioration in Function:  Goal: Ability to perform activities of daily living will improve  Description: Ability to perform activities of daily living will improve  3/22/2020 2203 by Vasyl Jeffery  Outcome: Ongoing  Note: Patient has flat affect, is isolative to room, expressionless. Cooperative. Compliant with all prescribed medications during this shift. Safety checks maintained q15min and irregular rounding. Problem: Altered Mood, Deterioration in Function:  Goal: Able to verbalize reality based thinking  Description: Able to verbalize reality based thinking  3/22/2020 2203 by Vasyl Jeffery  Outcome: Ongoing  Note: Pt denies thoughts of self harm and is agreeable to seeking out should thoughts of self harm arise. Safe environment maintained. Q15 minute checks for safety cont per unit policy. Will cont to monitor for safety and provides support and reassurance as needed. Denies hallucinations but appears to be responding to internal stimuli.

## 2020-03-24 NOTE — DISCHARGE SUMMARY
DISCHARGE      SUMMARY                                                               DEMOGRAPHICS          Facundo Larry is a 68 y.o.   female     DATE OF ADMISSION: 2/28/2020  DATE OF DISCHARGE: 03/23/20  DISCHARGE DIAGNOSES:   Principal Problem:    Alzheimer's disease ([facility]  Active Problems:    Schizophrenia ([facility]    Moderate malnutrition ([facility]  Resolved Problems:    * No resolved hospital problems. *  Facundo Larry is a 68 y.o. female who presents with Schizophrenia [facility]  The patient has been transferred to from the [facility] where she was a presented to her follow-up appointment not taking care of her basic needs and changes in her mental status. The patient was medically cleared by the emergency department and then transferred to the psychiatric unit for further evaluation and management. HOSPITAL COURSE    The patient presented as a 17-year-old  woman who has previously lived with her bipolar type since 10. Her guardian reported possibility of dementia but never officially diagnosed. It was reported that the patient was not taking care of her basic needs and recently lost 10 pounds over the last 3 days. The patient was brought by the police department of [facility] on a pink slip as the patient was refusing to be admitted. Records indicated \"Patients guardian states they took patient to [facility] Emergency room yesterday for a medical check up as patient \"wasn't able to make words, and not thinking clearly. \" Patients guardian states it looked like patient lost 10 lbs in the last 3 days, and has begun having delusions. \" Patients guardian states patient was irritable and angry and not speaking to her family.  Patients guardian states patient was last hospitalized at [address] [place] [address] about 2 years ago, and states they believe patient may have mild symptoms of dementia, but states they never officially diagnosed patient. Patients guardian states patient has been taking her medications daily (Latuda 20 mg 1x a day) as a nurse comes out to her home\". The patient reported doing for her admission \"I did not tell my psychiatrist\" which was I was not taking my medications\". When the writer asked her about the reason for why she is not taking her meds, her answer was \"because I do not want my children to give me a shots\" the patient was oriented to time place person. She was able to call the current president of [place]. The patient was able to tell date of today and the reason for her admission. She denied visual or auditory hallucinations. She denied suicidal homicidal thoughts. The patient denied paranoid ideations. The writer had the opportunity to talk to the patient's daughter Lesia Su who is the guardian who reported that the patient was not taking care of her basic needs and lost over 10 pounds over the last week. For detailed history, mental status examination at time of admission, diagnoses and treatment plan, please see the dictated psychiatric evaluation at the time of admission  After admission, patient was seen in individual supportive psychotherapy, was encouraged to participate in unit milieu. Case was also discussed with the staff. The chart has been reviewed and the patient was interviewed at the bedside. The patient reported that she was not taking her medication and was spitting them out. The patient denied current thoughts of harming self or others. She denied visual or auditory hallucinations. There was no behavioral problem reported by the nursing staff reported from obsessive thinking and passing in the hallway. The patient did not need any emergency medications since admission. We will continue monitoring for symptoms of psychosis and to adjust her medications as needed. The patient is still aloof to self with poor interaction with others.   She was guarded with depressed mood and flat affect during the evaluation. She exhibited some anxiety during the evaluation and restlessness. She denied current thoughts of harming self or others. She is oriented to time place and person. The patient denied visual or auditory hallucinations. However, she was preoccupied and obsessed. The patient was compliant with medications under the supervision of the nursing staff. We will continue monitoring for symptoms of depressed mood, psychotic symptoms and to adjust her medications as tolerated. Increase the Trintellix to 10 mg p.o. daily and Latuda to 40 mg p.o. daily for symptoms of depression and psychosis. The patient is still reporting baseline and anxiety. However, she denied worsening symptoms of depression. She denied visual or auditory hallucinations. She is still exhibiting hyperactivity and restlessness. She was paranoid and mistrustful. Her affect was flat and his mood was depressed. The patient was guarded during the evaluation. There was no behavioral problem reported by the nursing staff. The patient was taking her medications as prescribed with close observation by the nursing staff. There was no side effects of the medication. We will continue monitoring for symptoms of mood disorder, suicidal behavior and to adjust her medications as needed. The patient was guarded with depressed mood and a flat affect during the evaluation. However, she was not cooperative in answering the questions with brief answers. The patient denied current thoughts of harming self or others. She denied visual or auditory hallucinations. She was confused and suspicious. It was reported by the nursing staff that the patient needs encouragement for her meals and to take care of her ADLs. There was no behavioral problem reported by the nursing staff. The patient has been compliant with her medications with close observation by the nursing staff.   The patient refusing to attend therapy groups in the unit and was isolative to self. The patient agreed to increase Aricept to 10 mg for symptoms of dementia. We will continue monitoring for symptoms of depression, suicidal behavior and to adjust her medications as needed. The writer called the patient's guardian (her daughter BODØ) for over 15 minutes discussing the updates in her case, placement options and the prognosis of the case. All her questions have been answered and all concerns were addressed. She reported feeling upset the nurses are not calling her every day and giving her updates about her mother.    The patient was confused, guarded with depressed mood and a flat affect during the evaluation. She reported \"I don't want to talk to my daughter BODØ. She is mean and she is the one who put me at the hospital\". The patient denied current thoughts of harming self or others.  She denied visual or auditory hallucinations.  She was confused and suspicious.  It was reported by the nursing staff that the patient needs encouragement for her meals and to take care of her ADLs and was refusing her medications early this morning.  There was no behavioral problem reported by the nursing staff. The patient refusing to attend therapy groups in the unit and was isolative to self despite of continuous encouragement by the nursing staff.  The patient denied side effect of medications. Kristina Jaramillo will continue monitoring for symptoms of depression, suicidal behavior and to adjust her medications as needed. CMP, CBC with diff, TSH, B 12 and folic acid level ordered and the results are still pending. .    There was no changes in mental status from the last note. The patient was tolerating risperidone with no side effects of the medication. The patient denied current thoughts of harming self or others. The patient denied visual or auditory hallucinations. Patient has been taking her medications. Kanika Washington is still needing encouragement microspheres ER 25 MG Srer injection  Commonly known as:  RISPERDAL CONSTA  Inject 2 mLs into the muscle every 14 days  Start taking on:  April 5, 2020  Notes to patient:  Clear thoughts     rivastigmine 9.5 MG/24HR  Commonly known as:  Exelon  Place 1 patch onto the skin nightly  Notes to patient:  Dementia        STOP taking these medications    Latuda 20 MG Tabs tablet  Generic drug:  lurasidone           Where to Get Your Medications      These medications were sent to [facility] [phone] - F [phone]  [address] [place] [address] [facility] [address] [facility]    Phone:  [phone]   · mirtazapine 15 MG disintegrating tablet  · risperiDONE 1 MG disintegrating tablet  · risperiDONE microspheres ER 25 MG Srer injection  · rivastigmine 9.5 MG/24HR           Jessica Nina MD

## 2021-02-26 ENCOUNTER — HOSPITAL ENCOUNTER (OUTPATIENT)
Age: 71
Setting detail: SPECIMEN
Discharge: HOME | End: 2021-02-26
Payer: COMMERCIAL

## 2021-02-26 LAB
-: ABNORMAL
AMORPHOUS: ABNORMAL
BACTERIA: ABNORMAL
BILIRUBIN URINE: NEGATIVE
CASTS UA: ABNORMAL /LPF
COLOR: YELLOW
COMMENT UA: ABNORMAL
CRYSTALS, UA: ABNORMAL /HPF
EPITHELIAL CELLS UA: ABNORMAL /HPF (ref 0–5)
GLUCOSE URINE: NEGATIVE
KETONES, URINE: NEGATIVE
LEUKOCYTE ESTERASE, URINE: NEGATIVE
MUCUS: ABNORMAL
NITRITE, URINE: NEGATIVE
OTHER OBSERVATIONS UA: ABNORMAL
PH UA: 6 (ref 5–8)
PROTEIN UA: NEGATIVE
RBC UA: ABNORMAL /HPF (ref 0–2)
RENAL EPITHELIAL, UA: ABNORMAL /HPF
SPECIFIC GRAVITY UA: 1.03 (ref 1–1.03)
TRICHOMONAS: ABNORMAL
TURBIDITY: ABNORMAL
URINE HGB: NEGATIVE
UROBILINOGEN, URINE: NORMAL
WBC UA: ABNORMAL /HPF (ref 0–5)
YEAST: ABNORMAL

## 2021-02-26 PROCEDURE — 81001 URINALYSIS AUTO W/SCOPE: CPT

## 2023-01-24 ENCOUNTER — HOSPITAL ENCOUNTER (OUTPATIENT)
Age: 73
Setting detail: SPECIMEN
Discharge: HOME | End: 2023-01-24
Payer: COMMERCIAL

## 2023-01-24 LAB
CHOLESTEROL/HDL RATIO: 5
CHOLESTEROL: 211 MG/DL
HCT VFR BLD CALC: 44.8 % (ref 36.3–47.1)
HDLC SERPL-MCNC: 42 MG/DL
HEMOGLOBIN: 14.6 G/DL (ref 11.9–15.1)
LDL CHOLESTEROL: 151 MG/DL (ref 0–130)
MCH RBC QN AUTO: 30.2 PG (ref 25.2–33.5)
MCHC RBC AUTO-ENTMCNC: 32.6 G/DL (ref 28.4–34.8)
MCV RBC AUTO: 92.8 FL (ref 82.6–102.9)
NRBC AUTOMATED: 0 PER 100 WBC
PDW BLD-RTO: 14.1 % (ref 11.8–14.4)
PLATELET # BLD: 184 K/UL (ref 138–453)
PMV BLD AUTO: 11.6 FL (ref 8.1–13.5)
RBC # BLD: 4.83 M/UL (ref 3.95–5.11)
TRIGL SERPL-MCNC: 92 MG/DL
WBC # BLD: 5.7 K/UL (ref 3.5–11.3)

## 2023-01-24 PROCEDURE — 85027 COMPLETE CBC AUTOMATED: CPT

## 2023-01-24 PROCEDURE — P9603 ONE-WAY ALLOW PRORATED MILES: HCPCS

## 2023-01-24 PROCEDURE — 36415 COLL VENOUS BLD VENIPUNCTURE: CPT

## 2023-01-24 PROCEDURE — 80061 LIPID PANEL: CPT

## 2023-02-09 ENCOUNTER — HOSPITAL ENCOUNTER (OUTPATIENT)
Age: 73
Setting detail: SPECIMEN
Discharge: HOME | End: 2023-02-09
Payer: COMMERCIAL

## 2023-02-09 PROCEDURE — 36415 COLL VENOUS BLD VENIPUNCTURE: CPT

## 2023-02-09 PROCEDURE — 82627 DEHYDROEPIANDROSTERONE: CPT

## 2023-02-09 PROCEDURE — 84403 ASSAY OF TOTAL TESTOSTERONE: CPT

## 2023-02-09 PROCEDURE — 82330 ASSAY OF CALCIUM: CPT

## 2023-02-09 PROCEDURE — 84481 FREE ASSAY (FT-3): CPT

## 2023-02-09 PROCEDURE — 84445 ASSAY OF TSI GLOBULIN: CPT

## 2023-02-09 PROCEDURE — 84144 ASSAY OF PROGESTERONE: CPT

## 2023-02-09 PROCEDURE — 84439 ASSAY OF FREE THYROXINE: CPT

## 2023-02-09 PROCEDURE — 83970 ASSAY OF PARATHORMONE: CPT

## 2023-02-09 PROCEDURE — 82670 ASSAY OF TOTAL ESTRADIOL: CPT

## 2023-02-09 PROCEDURE — 86376 MICROSOMAL ANTIBODY EACH: CPT

## 2023-02-09 PROCEDURE — 84482 T3 REVERSE: CPT

## 2023-02-09 PROCEDURE — P9603 ONE-WAY ALLOW PRORATED MILES: HCPCS

## 2023-02-09 PROCEDURE — 83520 IMMUNOASSAY QUANT NOS NONAB: CPT

## 2023-02-09 PROCEDURE — 84443 ASSAY THYROID STIM HORMONE: CPT

## 2023-02-09 PROCEDURE — 82306 VITAMIN D 25 HYDROXY: CPT

## 2023-02-09 PROCEDURE — 86800 THYROGLOBULIN ANTIBODY: CPT

## 2023-02-10 LAB
25(OH)D3 SERPL-MCNC: 35.9 NG/ML
CA-I BLD-SCNC: 1.52 MMOL/L (ref 1.13–1.33)
DHEA-S SERPL-MCNC: 41.5 UG/DL (ref 10–90)
ESTRADIOL LEVEL: <5 PG/ML (ref 5–50)
PROGEST SERPL-MCNC: 0.22 NG/ML
PTH-INTACT SERPL-MCNC: 92.2 PG/ML (ref 14–72)
T3FREE SERPL-MCNC: 2.96 PG/ML (ref 2.02–4.43)
T4 FREE SERPL-MCNC: 1.17 NG/DL (ref 0.93–1.7)
TESTOST SERPL-MCNC: 17 NG/DL (ref 20–70)
TSH RECEPTOR AB: <0.8 IU/L
TSH SERPL-ACNC: <0.01 UIU/ML (ref 0.3–5)

## 2023-02-11 LAB — THYROID STIMULATING IMMUNOGLOB: <0.1 IU/L

## 2023-02-12 LAB
THYROGLOBULIN AB: <12 IU/ML (ref 0–40)
THYROPEROXIDASE AB SERPL IA-ACNC: <4 IU/ML (ref 0–25)

## 2023-05-10 ENCOUNTER — HOSPITAL ENCOUNTER (OUTPATIENT)
Age: 73
Setting detail: SPECIMEN
Discharge: HOME | End: 2023-05-10
Payer: COMMERCIAL

## 2023-05-10 PROCEDURE — 81050 URINALYSIS VOLUME MEASURE: CPT

## 2023-05-10 PROCEDURE — 82570 ASSAY OF URINE CREATININE: CPT

## 2023-05-10 PROCEDURE — 82340 ASSAY OF CALCIUM IN URINE: CPT

## 2023-05-11 LAB
CALCIUM URINE: 34.6 MG/DL
CALCIUM, URINE: 335 MG/24 H (ref 20–275)
CREATININE URINE: 74.4 MG/DL
CREATININE, 24H UR: 721 MG/24 H (ref 740–1570)
HOURS COLLECTED: 24 H
HOURS COLLECTED: 24 H
VOLUME: 969 ML
VOLUME: 969 ML

## 2023-05-15 ENCOUNTER — HOSPITAL ENCOUNTER (OUTPATIENT)
Age: 73
Setting detail: SPECIMEN
Discharge: HOME | End: 2023-05-15
Payer: COMMERCIAL

## 2023-05-15 LAB
25(OH)D3 SERPL-MCNC: 44.6 NG/ML
CALCIUM SERPL-MCNC: 11 MG/DL (ref 8.6–10.4)
CREAT SERPL-MCNC: 0.59 MG/DL (ref 0.5–0.9)
GFR SERPL CREATININE-BSD FRML MDRD: >60 ML/MIN/1.73M2
PTH-INTACT SERPL-MCNC: 115.8 PG/ML (ref 14–72)
T3FREE SERPL-MCNC: 3.23 PG/ML (ref 2.02–4.43)
T4 FREE SERPL-MCNC: 1.4 NG/DL (ref 0.9–1.7)
TSH SERPL-ACNC: <0.01 UIU/ML (ref 0.3–5)

## 2023-05-15 PROCEDURE — 82310 ASSAY OF CALCIUM: CPT

## 2023-05-15 PROCEDURE — 86376 MICROSOMAL ANTIBODY EACH: CPT

## 2023-05-15 PROCEDURE — 82306 VITAMIN D 25 HYDROXY: CPT

## 2023-05-15 PROCEDURE — 36415 COLL VENOUS BLD VENIPUNCTURE: CPT

## 2023-05-15 PROCEDURE — 82565 ASSAY OF CREATININE: CPT

## 2023-05-15 PROCEDURE — 84439 ASSAY OF FREE THYROXINE: CPT

## 2023-05-15 PROCEDURE — 84443 ASSAY THYROID STIM HORMONE: CPT

## 2023-05-15 PROCEDURE — P9603 ONE-WAY ALLOW PRORATED MILES: HCPCS

## 2023-05-15 PROCEDURE — 84481 FREE ASSAY (FT-3): CPT

## 2023-05-15 PROCEDURE — 83970 ASSAY OF PARATHORMONE: CPT

## 2023-05-17 LAB — THYROPEROXIDASE AB SERPL IA-ACNC: <4 IU/ML (ref 0–25)

## 2023-06-29 ENCOUNTER — HOSPITAL ENCOUNTER (OUTPATIENT)
Dept: NUCLEAR MEDICINE | Age: 73
Discharge: HOME | End: 2023-07-01
Attending: INTERNAL MEDICINE
Payer: COMMERCIAL

## 2023-06-29 DIAGNOSIS — E04.2 NONTOXIC MULTINODULAR GOITER: ICD-10-CM

## 2023-06-29 PROCEDURE — 3430000000 HC RX DIAGNOSTIC RADIOPHARMACEUTICAL: Performed by: INTERNAL MEDICINE

## 2023-06-29 PROCEDURE — 78071 PARATHYRD PLANAR W/WO SUBTRJ: CPT

## 2023-06-29 PROCEDURE — A9500 TC99M SESTAMIBI: HCPCS | Performed by: INTERNAL MEDICINE

## 2023-06-29 RX ORDER — TETRAKIS(2-METHOXYISOBUTYLISOCYANIDE)COPPER(I) TETRAFLUOROBORATE 1 MG/ML
20 INJECTION, POWDER, LYOPHILIZED, FOR SOLUTION INTRAVENOUS
Status: COMPLETED | OUTPATIENT
Start: 2023-06-29 | End: 2023-06-29

## 2023-06-29 RX ADMIN — TETRAKIS(2-METHOXYISOBUTYLISOCYANIDE)COPPER(I) TETRAFLUOROBORATE 20 MILLICURIE: 1 INJECTION, POWDER, LYOPHILIZED, FOR SOLUTION INTRAVENOUS at 09:30

## 2023-07-18 ENCOUNTER — HOSPITAL ENCOUNTER (OUTPATIENT)
Age: 73
Setting detail: SPECIMEN
Discharge: HOME | End: 2023-07-18
Payer: COMMERCIAL

## 2023-07-18 LAB
EST. AVERAGE GLUCOSE BLD GHB EST-MCNC: 91 MG/DL
HBA1C MFR BLD: 4.8 % (ref 4–6)

## 2023-07-18 PROCEDURE — 36415 COLL VENOUS BLD VENIPUNCTURE: CPT

## 2023-07-18 PROCEDURE — P9603 ONE-WAY ALLOW PRORATED MILES: HCPCS

## 2023-07-18 PROCEDURE — 83036 HEMOGLOBIN GLYCOSYLATED A1C: CPT

## 2023-07-25 ENCOUNTER — HOSPITAL ENCOUNTER (OUTPATIENT)
Age: 73
Setting detail: SPECIMEN
Discharge: HOME | End: 2023-07-25
Payer: COMMERCIAL

## 2023-07-25 LAB
CHOLEST SERPL-MCNC: 202 MG/DL
CHOLESTEROL/HDL RATIO: 4.9
ERYTHROCYTE [DISTWIDTH] IN BLOOD BY AUTOMATED COUNT: 14.1 % (ref 11.8–14.4)
HCT VFR BLD AUTO: 47.3 % (ref 36.3–47.1)
HDLC SERPL-MCNC: 41 MG/DL
HGB BLD-MCNC: 15.3 G/DL (ref 11.9–15.1)
LDLC SERPL CALC-MCNC: 140 MG/DL (ref 0–130)
MCH RBC QN AUTO: 29.9 PG (ref 25.2–33.5)
MCHC RBC AUTO-ENTMCNC: 32.3 G/DL (ref 28.4–34.8)
MCV RBC AUTO: 92.6 FL (ref 82.6–102.9)
NRBC BLD-RTO: 0 PER 100 WBC
PLATELET # BLD AUTO: 171 K/UL (ref 138–453)
PMV BLD AUTO: 11.1 FL (ref 8.1–13.5)
RBC # BLD AUTO: 5.11 M/UL (ref 3.95–5.11)
TRIGL SERPL-MCNC: 103 MG/DL
WBC OTHER # BLD: 5.6 K/UL (ref 3.5–11.3)

## 2023-07-25 PROCEDURE — 36415 COLL VENOUS BLD VENIPUNCTURE: CPT

## 2023-07-25 PROCEDURE — 80061 LIPID PANEL: CPT

## 2023-07-25 PROCEDURE — P9603 ONE-WAY ALLOW PRORATED MILES: HCPCS

## 2023-07-25 PROCEDURE — 85027 COMPLETE CBC AUTOMATED: CPT

## 2023-09-18 ENCOUNTER — HOSPITAL ENCOUNTER (OUTPATIENT)
Age: 73
Setting detail: SPECIMEN
Discharge: HOME | End: 2023-09-18
Payer: COMMERCIAL

## 2023-09-18 LAB
ALBUMIN SERPL-MCNC: 3.3 G/DL (ref 3.5–5.2)
ALBUMIN/GLOB SERPL: 1.2 {RATIO} (ref 1–2.5)
ALP SERPL-CCNC: 173 U/L (ref 35–104)
ALT SERPL-CCNC: 15 U/L (ref 5–33)
ANION GAP SERPL CALCULATED.3IONS-SCNC: 13 MMOL/L (ref 9–17)
AST SERPL-CCNC: 17 U/L
BILIRUB SERPL-MCNC: 0.5 MG/DL (ref 0.3–1.2)
BUN SERPL-MCNC: 15 MG/DL (ref 8–23)
CALCIUM SERPL-MCNC: 6.4 MG/DL (ref 8.6–10.4)
CHLORIDE SERPL-SCNC: 104 MMOL/L (ref 98–107)
CO2 SERPL-SCNC: 24 MMOL/L (ref 20–31)
CREAT SERPL-MCNC: 0.8 MG/DL (ref 0.5–0.9)
ERYTHROCYTE [DISTWIDTH] IN BLOOD BY AUTOMATED COUNT: 14.4 % (ref 11.8–14.4)
GFR SERPL CREATININE-BSD FRML MDRD: >60 ML/MIN/1.73M2
GLUCOSE SERPL-MCNC: 85 MG/DL (ref 70–99)
HCT VFR BLD AUTO: 43 % (ref 36.3–47.1)
HGB BLD-MCNC: 14 G/DL (ref 11.9–15.1)
MAGNESIUM SERPL-MCNC: 1.9 MG/DL (ref 1.6–2.6)
MCH RBC QN AUTO: 31 PG (ref 25.2–33.5)
MCHC RBC AUTO-ENTMCNC: 32.6 G/DL (ref 28.4–34.8)
MCV RBC AUTO: 95.3 FL (ref 82.6–102.9)
NRBC BLD-RTO: 0 PER 100 WBC
PHOSPHATE SERPL-MCNC: 4.9 MG/DL (ref 2.6–4.5)
PLATELET # BLD AUTO: 194 K/UL (ref 138–453)
PMV BLD AUTO: 10.9 FL (ref 8.1–13.5)
POTASSIUM SERPL-SCNC: 3.5 MMOL/L (ref 3.7–5.3)
PROT SERPL-MCNC: 6.1 G/DL (ref 6.4–8.3)
RBC # BLD AUTO: 4.51 M/UL (ref 3.95–5.11)
SODIUM SERPL-SCNC: 141 MMOL/L (ref 135–144)
T4 FREE SERPL-MCNC: 1.2 NG/DL (ref 0.9–1.7)
TSH SERPL DL<=0.05 MIU/L-ACNC: 7.58 UIU/ML (ref 0.3–5)
WBC OTHER # BLD: 6.5 K/UL (ref 3.5–11.3)

## 2023-09-18 PROCEDURE — 80053 COMPREHEN METABOLIC PANEL: CPT

## 2023-09-18 PROCEDURE — 84439 ASSAY OF FREE THYROXINE: CPT

## 2023-09-18 PROCEDURE — P9603 ONE-WAY ALLOW PRORATED MILES: HCPCS

## 2023-09-18 PROCEDURE — 84443 ASSAY THYROID STIM HORMONE: CPT

## 2023-09-18 PROCEDURE — 83735 ASSAY OF MAGNESIUM: CPT

## 2023-09-18 PROCEDURE — 36415 COLL VENOUS BLD VENIPUNCTURE: CPT

## 2023-09-18 PROCEDURE — 85027 COMPLETE CBC AUTOMATED: CPT

## 2023-09-18 PROCEDURE — 84100 ASSAY OF PHOSPHORUS: CPT

## 2023-09-21 ENCOUNTER — HOSPITAL ENCOUNTER (OUTPATIENT)
Age: 73
Setting detail: SPECIMEN
Discharge: HOME | End: 2023-09-21
Payer: COMMERCIAL

## 2023-09-21 LAB
CALCIUM SERPL-MCNC: 7.8 MG/DL (ref 8.6–10.4)
PTH-INTACT SERPL-MCNC: 35.5 PG/ML (ref 14–72)

## 2023-09-21 PROCEDURE — 36415 COLL VENOUS BLD VENIPUNCTURE: CPT

## 2023-09-21 PROCEDURE — 82310 ASSAY OF CALCIUM: CPT

## 2023-09-21 PROCEDURE — P9603 ONE-WAY ALLOW PRORATED MILES: HCPCS

## 2023-09-21 PROCEDURE — 83970 ASSAY OF PARATHORMONE: CPT

## 2023-09-25 ENCOUNTER — HOSPITAL ENCOUNTER (OUTPATIENT)
Age: 73
Setting detail: SPECIMEN
Discharge: HOME | End: 2023-09-25
Payer: COMMERCIAL

## 2023-09-25 LAB — CALCIUM SERPL-MCNC: 7 MG/DL (ref 8.6–10.4)

## 2023-09-25 PROCEDURE — P9603 ONE-WAY ALLOW PRORATED MILES: HCPCS

## 2023-09-25 PROCEDURE — 82310 ASSAY OF CALCIUM: CPT

## 2023-09-25 PROCEDURE — 36415 COLL VENOUS BLD VENIPUNCTURE: CPT

## 2023-09-28 ENCOUNTER — HOSPITAL ENCOUNTER (OUTPATIENT)
Age: 73
Setting detail: SPECIMEN
Discharge: HOME | End: 2023-09-28

## 2023-10-02 ENCOUNTER — HOSPITAL ENCOUNTER (OUTPATIENT)
Age: 73
Setting detail: SPECIMEN
Discharge: HOME | End: 2023-10-02
Payer: COMMERCIAL

## 2023-10-02 LAB
CALCIUM SERPL-MCNC: 9 MG/DL (ref 8.6–10.4)
PTH-INTACT SERPL-MCNC: 22 PG/ML (ref 14–72)

## 2023-10-02 PROCEDURE — 83970 ASSAY OF PARATHORMONE: CPT

## 2023-10-02 PROCEDURE — 82310 ASSAY OF CALCIUM: CPT

## 2023-10-02 PROCEDURE — P9603 ONE-WAY ALLOW PRORATED MILES: HCPCS

## 2023-10-02 PROCEDURE — 36415 COLL VENOUS BLD VENIPUNCTURE: CPT

## 2023-10-05 ENCOUNTER — HOSPITAL ENCOUNTER (OUTPATIENT)
Age: 73
Setting detail: SPECIMEN
Discharge: HOME | End: 2023-10-05
Payer: COMMERCIAL

## 2023-10-05 LAB
CALCIUM SERPL-MCNC: 9 MG/DL (ref 8.6–10.4)
PTH-INTACT SERPL-MCNC: 11.7 PG/ML (ref 14–72)

## 2023-10-05 PROCEDURE — 82310 ASSAY OF CALCIUM: CPT

## 2023-10-05 PROCEDURE — 83970 ASSAY OF PARATHORMONE: CPT

## 2023-10-05 PROCEDURE — P9603 ONE-WAY ALLOW PRORATED MILES: HCPCS

## 2023-10-05 PROCEDURE — 36415 COLL VENOUS BLD VENIPUNCTURE: CPT

## 2023-10-11 ENCOUNTER — HOSPITAL ENCOUNTER (OUTPATIENT)
Age: 73
Setting detail: SPECIMEN
Discharge: HOME | End: 2023-10-11

## 2023-10-11 LAB
ALBUMIN SERPL-MCNC: 3.3 G/DL (ref 3.5–5.2)
ALBUMIN/GLOB SERPL: 1.2 {RATIO} (ref 1–2.5)
ALP SERPL-CCNC: 157 U/L (ref 35–104)
ALT SERPL-CCNC: 17 U/L (ref 5–33)
ANION GAP SERPL CALCULATED.3IONS-SCNC: 11 MMOL/L (ref 9–17)
AST SERPL-CCNC: 17 U/L
BILIRUB SERPL-MCNC: 0.4 MG/DL (ref 0.3–1.2)
BUN SERPL-MCNC: 20 MG/DL (ref 8–23)
CALCIUM SERPL-MCNC: 7.7 MG/DL (ref 8.6–10.4)
CHLORIDE SERPL-SCNC: 108 MMOL/L (ref 98–107)
CO2 SERPL-SCNC: 24 MMOL/L (ref 20–31)
CREAT SERPL-MCNC: 0.7 MG/DL (ref 0.5–0.9)
ERYTHROCYTE [DISTWIDTH] IN BLOOD BY AUTOMATED COUNT: 13.6 % (ref 11.8–14.4)
GFR SERPL CREATININE-BSD FRML MDRD: >60 ML/MIN/1.73M2
GLUCOSE SERPL-MCNC: 95 MG/DL (ref 70–99)
HCT VFR BLD AUTO: 44.5 % (ref 36.3–47.1)
HGB BLD-MCNC: 14.6 G/DL (ref 11.9–15.1)
MAGNESIUM SERPL-MCNC: 1.7 MG/DL (ref 1.6–2.6)
MCH RBC QN AUTO: 30.9 PG (ref 25.2–33.5)
MCHC RBC AUTO-ENTMCNC: 32.8 G/DL (ref 28.4–34.8)
MCV RBC AUTO: 94.3 FL (ref 82.6–102.9)
NRBC BLD-RTO: 0 PER 100 WBC
PLATELET # BLD AUTO: 141 K/UL (ref 138–453)
PMV BLD AUTO: 11.2 FL (ref 8.1–13.5)
POTASSIUM SERPL-SCNC: 3.7 MMOL/L (ref 3.7–5.3)
PROT SERPL-MCNC: 6 G/DL (ref 6.4–8.3)
PTH-INTACT SERPL-MCNC: 52 PG/ML (ref 15–65)
RBC # BLD AUTO: 4.72 M/UL (ref 3.95–5.11)
SODIUM SERPL-SCNC: 143 MMOL/L (ref 135–144)
WBC OTHER # BLD: 5.1 K/UL (ref 3.5–11.3)

## 2023-10-11 PROCEDURE — P9603 ONE-WAY ALLOW PRORATED MILES: HCPCS

## 2023-10-11 PROCEDURE — 83735 ASSAY OF MAGNESIUM: CPT

## 2023-10-11 PROCEDURE — 83970 ASSAY OF PARATHORMONE: CPT

## 2023-10-11 PROCEDURE — 36415 COLL VENOUS BLD VENIPUNCTURE: CPT

## 2023-10-11 PROCEDURE — 80053 COMPREHEN METABOLIC PANEL: CPT

## 2023-10-11 PROCEDURE — 85027 COMPLETE CBC AUTOMATED: CPT

## 2023-10-12 ENCOUNTER — HOSPITAL ENCOUNTER (OUTPATIENT)
Age: 73
Setting detail: SPECIMEN
Discharge: HOME | End: 2023-10-12
Payer: COMMERCIAL

## 2023-10-12 LAB
CALCIUM SERPL-MCNC: 7.7 MG/DL (ref 8.6–10.4)
PTH-INTACT SERPL-MCNC: 52.4 PG/ML (ref 14–72)
T4 FREE SERPL-MCNC: 1.6 NG/DL (ref 0.9–1.7)
TSH SERPL DL<=0.05 MIU/L-ACNC: 1.55 UIU/ML (ref 0.3–5)

## 2023-10-12 PROCEDURE — 83970 ASSAY OF PARATHORMONE: CPT

## 2023-10-12 PROCEDURE — 82310 ASSAY OF CALCIUM: CPT

## 2023-10-12 PROCEDURE — 84439 ASSAY OF FREE THYROXINE: CPT

## 2023-10-12 PROCEDURE — P9603 ONE-WAY ALLOW PRORATED MILES: HCPCS

## 2023-10-12 PROCEDURE — 84443 ASSAY THYROID STIM HORMONE: CPT

## 2023-10-12 PROCEDURE — 36415 COLL VENOUS BLD VENIPUNCTURE: CPT

## 2023-10-13 ENCOUNTER — HOSPITAL ENCOUNTER (OUTPATIENT)
Age: 73
Setting detail: SPECIMEN
Discharge: HOME | End: 2023-10-13
Payer: COMMERCIAL

## 2023-10-13 LAB — CALCIUM SERPL-MCNC: 10.3 MG/DL (ref 8.6–10.4)

## 2023-10-13 PROCEDURE — 82310 ASSAY OF CALCIUM: CPT

## 2023-10-13 PROCEDURE — P9603 ONE-WAY ALLOW PRORATED MILES: HCPCS

## 2023-10-13 PROCEDURE — 36415 COLL VENOUS BLD VENIPUNCTURE: CPT

## 2023-10-16 ENCOUNTER — HOSPITAL ENCOUNTER (OUTPATIENT)
Age: 73
Setting detail: SPECIMEN
Discharge: HOME | End: 2023-10-16
Payer: COMMERCIAL

## 2023-10-16 LAB
CALCIUM SERPL-MCNC: 8.8 MG/DL (ref 8.6–10.4)
PTH-INTACT SERPL-MCNC: 27.7 PG/ML (ref 14–72)

## 2023-10-16 PROCEDURE — P9603 ONE-WAY ALLOW PRORATED MILES: HCPCS

## 2023-10-16 PROCEDURE — 82310 ASSAY OF CALCIUM: CPT

## 2023-10-16 PROCEDURE — 36415 COLL VENOUS BLD VENIPUNCTURE: CPT

## 2023-10-16 PROCEDURE — 83970 ASSAY OF PARATHORMONE: CPT

## 2023-10-19 ENCOUNTER — HOSPITAL ENCOUNTER (OUTPATIENT)
Age: 73
Setting detail: SPECIMEN
Discharge: HOME | End: 2023-10-19
Payer: COMMERCIAL

## 2023-10-19 LAB
CALCIUM SERPL-MCNC: 8.1 MG/DL (ref 8.6–10.4)
PTH-INTACT SERPL-MCNC: 40.6 PG/ML (ref 14–72)

## 2023-10-19 PROCEDURE — 82310 ASSAY OF CALCIUM: CPT

## 2023-10-19 PROCEDURE — 83970 ASSAY OF PARATHORMONE: CPT

## 2023-10-19 PROCEDURE — P9603 ONE-WAY ALLOW PRORATED MILES: HCPCS

## 2023-10-19 PROCEDURE — 36415 COLL VENOUS BLD VENIPUNCTURE: CPT

## 2023-10-23 ENCOUNTER — HOSPITAL ENCOUNTER (OUTPATIENT)
Age: 73
Setting detail: SPECIMEN
Discharge: HOME | End: 2023-10-23
Payer: COMMERCIAL

## 2023-10-23 LAB
CALCIUM SERPL-MCNC: 7.7 MG/DL (ref 8.6–10.4)
PTH-INTACT SERPL-MCNC: 57 PG/ML (ref 15–65)

## 2023-10-23 PROCEDURE — 83970 ASSAY OF PARATHORMONE: CPT

## 2023-10-23 PROCEDURE — P9603 ONE-WAY ALLOW PRORATED MILES: HCPCS

## 2023-10-23 PROCEDURE — 82310 ASSAY OF CALCIUM: CPT

## 2023-10-23 PROCEDURE — 36415 COLL VENOUS BLD VENIPUNCTURE: CPT

## 2023-10-26 ENCOUNTER — HOSPITAL ENCOUNTER (OUTPATIENT)
Age: 73
Setting detail: SPECIMEN
Discharge: HOME | End: 2023-10-26
Payer: COMMERCIAL

## 2023-10-26 LAB
CALCIUM SERPL-MCNC: 8 MG/DL (ref 8.6–10.4)
PTH-INTACT SERPL-MCNC: 53.1 PG/ML (ref 14–72)

## 2023-10-26 PROCEDURE — 82310 ASSAY OF CALCIUM: CPT

## 2023-10-26 PROCEDURE — 36415 COLL VENOUS BLD VENIPUNCTURE: CPT

## 2023-10-26 PROCEDURE — P9603 ONE-WAY ALLOW PRORATED MILES: HCPCS

## 2023-10-26 PROCEDURE — 83970 ASSAY OF PARATHORMONE: CPT

## 2023-10-30 ENCOUNTER — HOSPITAL ENCOUNTER (OUTPATIENT)
Age: 73
Setting detail: SPECIMEN
Discharge: HOME | End: 2023-10-30
Payer: COMMERCIAL

## 2023-10-30 LAB
CALCIUM SERPL-MCNC: 8.4 MG/DL (ref 8.6–10.4)
PTH-INTACT SERPL-MCNC: 43.1 PG/ML (ref 14–72)

## 2023-10-30 PROCEDURE — P9603 ONE-WAY ALLOW PRORATED MILES: HCPCS

## 2023-10-30 PROCEDURE — 83970 ASSAY OF PARATHORMONE: CPT

## 2023-10-30 PROCEDURE — 82310 ASSAY OF CALCIUM: CPT

## 2023-10-30 PROCEDURE — 36415 COLL VENOUS BLD VENIPUNCTURE: CPT

## 2023-11-02 ENCOUNTER — HOSPITAL ENCOUNTER (OUTPATIENT)
Age: 73
Setting detail: SPECIMEN
Discharge: HOME | End: 2023-11-02
Payer: COMMERCIAL

## 2023-11-02 LAB
CALCIUM SERPL-MCNC: 8.7 MG/DL (ref 8.6–10.4)
PTH-INTACT SERPL-MCNC: 32.3 PG/ML (ref 14–72)

## 2023-11-02 PROCEDURE — P9603 ONE-WAY ALLOW PRORATED MILES: HCPCS

## 2023-11-02 PROCEDURE — 36415 COLL VENOUS BLD VENIPUNCTURE: CPT

## 2023-11-02 PROCEDURE — 82310 ASSAY OF CALCIUM: CPT

## 2023-11-02 PROCEDURE — 83970 ASSAY OF PARATHORMONE: CPT

## 2023-11-06 ENCOUNTER — HOSPITAL ENCOUNTER (OUTPATIENT)
Age: 73
Setting detail: SPECIMEN
Discharge: HOME | End: 2023-11-06
Payer: COMMERCIAL

## 2023-11-06 LAB
CALCIUM SERPL-MCNC: 8.5 MG/DL (ref 8.6–10.4)
PTH-INTACT SERPL-MCNC: 38.2 PG/ML (ref 14–72)

## 2023-11-06 PROCEDURE — P9603 ONE-WAY ALLOW PRORATED MILES: HCPCS

## 2023-11-06 PROCEDURE — 36415 COLL VENOUS BLD VENIPUNCTURE: CPT

## 2023-11-06 PROCEDURE — 83970 ASSAY OF PARATHORMONE: CPT

## 2023-11-06 PROCEDURE — 82310 ASSAY OF CALCIUM: CPT

## 2023-11-09 ENCOUNTER — HOSPITAL ENCOUNTER (OUTPATIENT)
Age: 73
Setting detail: SPECIMEN
Discharge: HOME | End: 2023-11-09
Payer: COMMERCIAL

## 2023-11-09 LAB
CALCIUM SERPL-MCNC: 8.1 MG/DL (ref 8.6–10.4)
PTH-INTACT SERPL-MCNC: 55.8 PG/ML (ref 14–72)

## 2023-11-09 PROCEDURE — P9603 ONE-WAY ALLOW PRORATED MILES: HCPCS

## 2023-11-09 PROCEDURE — 83970 ASSAY OF PARATHORMONE: CPT

## 2023-11-09 PROCEDURE — 82310 ASSAY OF CALCIUM: CPT

## 2023-11-09 PROCEDURE — 36415 COLL VENOUS BLD VENIPUNCTURE: CPT

## 2023-11-13 ENCOUNTER — HOSPITAL ENCOUNTER (OUTPATIENT)
Age: 73
Setting detail: SPECIMEN
Discharge: HOME | End: 2023-11-13
Payer: COMMERCIAL

## 2023-11-13 LAB
CALCIUM SERPL-MCNC: 9.1 MG/DL (ref 8.6–10.4)
PTH-INTACT SERPL-MCNC: 22 PG/ML (ref 15–65)

## 2023-11-13 PROCEDURE — 82310 ASSAY OF CALCIUM: CPT

## 2023-11-13 PROCEDURE — 36415 COLL VENOUS BLD VENIPUNCTURE: CPT

## 2023-11-13 PROCEDURE — P9603 ONE-WAY ALLOW PRORATED MILES: HCPCS

## 2023-11-13 PROCEDURE — 83970 ASSAY OF PARATHORMONE: CPT

## 2023-11-16 ENCOUNTER — HOSPITAL ENCOUNTER (OUTPATIENT)
Age: 73
Setting detail: SPECIMEN
Discharge: HOME | End: 2023-11-16

## 2023-11-16 LAB
CALCIUM SERPL-MCNC: 8.8 MG/DL (ref 8.6–10.4)
PTH-INTACT SERPL-MCNC: 40.5 PG/ML (ref 14–72)

## 2023-11-16 PROCEDURE — 36415 COLL VENOUS BLD VENIPUNCTURE: CPT

## 2023-11-16 PROCEDURE — 82310 ASSAY OF CALCIUM: CPT

## 2023-11-16 PROCEDURE — P9603 ONE-WAY ALLOW PRORATED MILES: HCPCS

## 2023-11-16 PROCEDURE — 83970 ASSAY OF PARATHORMONE: CPT

## 2023-11-20 ENCOUNTER — HOSPITAL ENCOUNTER (OUTPATIENT)
Age: 73
Setting detail: SPECIMEN
Discharge: HOME | End: 2023-11-20
Payer: COMMERCIAL

## 2023-11-20 LAB
CALCIUM SERPL-MCNC: 8.2 MG/DL (ref 8.6–10.4)
PTH-INTACT SERPL-MCNC: 61.2 PG/ML (ref 14–72)

## 2023-11-20 PROCEDURE — 36415 COLL VENOUS BLD VENIPUNCTURE: CPT

## 2023-11-20 PROCEDURE — P9603 ONE-WAY ALLOW PRORATED MILES: HCPCS

## 2023-11-20 PROCEDURE — 82310 ASSAY OF CALCIUM: CPT

## 2023-11-20 PROCEDURE — 83970 ASSAY OF PARATHORMONE: CPT

## 2023-11-24 ENCOUNTER — HOSPITAL ENCOUNTER (OUTPATIENT)
Age: 73
Setting detail: SPECIMEN
Discharge: HOME | End: 2023-11-24

## 2023-11-24 LAB
CALCIUM SERPL-MCNC: 8.4 MG/DL (ref 8.6–10.4)
PTH-INTACT SERPL-MCNC: 39 PG/ML (ref 15–65)

## 2023-11-24 PROCEDURE — 83970 ASSAY OF PARATHORMONE: CPT

## 2023-11-24 PROCEDURE — 36415 COLL VENOUS BLD VENIPUNCTURE: CPT

## 2023-11-24 PROCEDURE — P9603 ONE-WAY ALLOW PRORATED MILES: HCPCS

## 2023-11-24 PROCEDURE — 82310 ASSAY OF CALCIUM: CPT

## 2023-11-27 ENCOUNTER — HOSPITAL ENCOUNTER (OUTPATIENT)
Age: 73
Setting detail: SPECIMEN
Discharge: HOME | End: 2023-11-27
Payer: COMMERCIAL

## 2023-11-27 LAB
CALCIUM SERPL-MCNC: 8.4 MG/DL (ref 8.6–10.4)
MAGNESIUM SERPL-MCNC: 1.9 MG/DL (ref 1.6–2.6)
PTH-INTACT SERPL-MCNC: 50.2 PG/ML (ref 14–72)

## 2023-11-27 PROCEDURE — P9603 ONE-WAY ALLOW PRORATED MILES: HCPCS

## 2023-11-27 PROCEDURE — 36415 COLL VENOUS BLD VENIPUNCTURE: CPT

## 2023-11-27 PROCEDURE — 83735 ASSAY OF MAGNESIUM: CPT

## 2023-11-27 PROCEDURE — 83970 ASSAY OF PARATHORMONE: CPT

## 2023-11-27 PROCEDURE — 82310 ASSAY OF CALCIUM: CPT

## 2023-11-30 ENCOUNTER — HOSPITAL ENCOUNTER (OUTPATIENT)
Age: 73
Setting detail: SPECIMEN
Discharge: HOME | End: 2023-11-30
Payer: COMMERCIAL

## 2023-11-30 LAB
CALCIUM SERPL-MCNC: 9.5 MG/DL (ref 8.6–10.4)
PTH-INTACT SERPL-MCNC: 11 PG/ML (ref 15–65)

## 2023-11-30 PROCEDURE — 82310 ASSAY OF CALCIUM: CPT

## 2023-11-30 PROCEDURE — 83970 ASSAY OF PARATHORMONE: CPT

## 2023-11-30 PROCEDURE — P9603 ONE-WAY ALLOW PRORATED MILES: HCPCS

## 2023-11-30 PROCEDURE — 36415 COLL VENOUS BLD VENIPUNCTURE: CPT

## 2023-12-04 ENCOUNTER — HOSPITAL ENCOUNTER (OUTPATIENT)
Age: 73
Setting detail: SPECIMEN
Discharge: HOME | End: 2023-12-04
Payer: COMMERCIAL

## 2023-12-04 LAB
CALCIUM SERPL-MCNC: 8.4 MG/DL (ref 8.6–10.4)
PTH-INTACT SERPL-MCNC: 28 PG/ML (ref 15–65)

## 2023-12-04 PROCEDURE — 83970 ASSAY OF PARATHORMONE: CPT

## 2023-12-04 PROCEDURE — 36415 COLL VENOUS BLD VENIPUNCTURE: CPT

## 2023-12-04 PROCEDURE — P9603 ONE-WAY ALLOW PRORATED MILES: HCPCS

## 2023-12-04 PROCEDURE — 82310 ASSAY OF CALCIUM: CPT

## 2023-12-07 ENCOUNTER — HOSPITAL ENCOUNTER (OUTPATIENT)
Age: 73
Setting detail: SPECIMEN
Discharge: HOME | End: 2023-12-07
Payer: COMMERCIAL

## 2023-12-07 LAB
CALCIUM SERPL-MCNC: 8 MG/DL (ref 8.6–10.4)
PTH-INTACT SERPL-MCNC: 45.1 PG/ML (ref 14–72)

## 2023-12-07 PROCEDURE — 83970 ASSAY OF PARATHORMONE: CPT

## 2023-12-07 PROCEDURE — P9603 ONE-WAY ALLOW PRORATED MILES: HCPCS

## 2023-12-07 PROCEDURE — 36415 COLL VENOUS BLD VENIPUNCTURE: CPT

## 2023-12-07 PROCEDURE — 82310 ASSAY OF CALCIUM: CPT

## 2023-12-11 ENCOUNTER — HOSPITAL ENCOUNTER (OUTPATIENT)
Age: 73
Setting detail: SPECIMEN
Discharge: HOME | End: 2023-12-11
Payer: COMMERCIAL

## 2023-12-11 LAB
CALCIUM SERPL-MCNC: 8.8 MG/DL (ref 8.6–10.4)
PTH-INTACT SERPL-MCNC: 17.9 PG/ML (ref 14–72)

## 2023-12-11 PROCEDURE — P9603 ONE-WAY ALLOW PRORATED MILES: HCPCS

## 2023-12-11 PROCEDURE — 83970 ASSAY OF PARATHORMONE: CPT

## 2023-12-11 PROCEDURE — 36415 COLL VENOUS BLD VENIPUNCTURE: CPT

## 2023-12-11 PROCEDURE — 82310 ASSAY OF CALCIUM: CPT

## 2023-12-14 ENCOUNTER — HOSPITAL ENCOUNTER (OUTPATIENT)
Age: 73
Setting detail: SPECIMEN
Discharge: HOME | End: 2023-12-14

## 2023-12-14 LAB
CALCIUM SERPL-MCNC: 8.5 MG/DL (ref 8.6–10.4)
PTH-INTACT SERPL-MCNC: 39.9 PG/ML (ref 14–72)

## 2023-12-14 PROCEDURE — 83970 ASSAY OF PARATHORMONE: CPT

## 2023-12-14 PROCEDURE — P9603 ONE-WAY ALLOW PRORATED MILES: HCPCS

## 2023-12-14 PROCEDURE — 36415 COLL VENOUS BLD VENIPUNCTURE: CPT

## 2023-12-14 PROCEDURE — 82310 ASSAY OF CALCIUM: CPT

## 2023-12-26 ENCOUNTER — HOSPITAL ENCOUNTER (OUTPATIENT)
Age: 73
Setting detail: SPECIMEN
Discharge: HOME | End: 2023-12-26
Payer: COMMERCIAL

## 2023-12-26 LAB
CALCIUM SERPL-MCNC: 8.2 MG/DL (ref 8.6–10.4)
PTH-INTACT SERPL-MCNC: 31 PG/ML (ref 15–65)

## 2023-12-26 PROCEDURE — 83970 ASSAY OF PARATHORMONE: CPT

## 2023-12-26 PROCEDURE — P9603 ONE-WAY ALLOW PRORATED MILES: HCPCS

## 2023-12-26 PROCEDURE — 82310 ASSAY OF CALCIUM: CPT

## 2023-12-26 PROCEDURE — 36415 COLL VENOUS BLD VENIPUNCTURE: CPT

## 2023-12-28 ENCOUNTER — HOSPITAL ENCOUNTER (OUTPATIENT)
Age: 73
Setting detail: SPECIMEN
Discharge: HOME | End: 2023-12-28
Payer: COMMERCIAL

## 2023-12-28 LAB
CALCIUM SERPL-MCNC: 8.3 MG/DL (ref 8.6–10.4)
PTH-INTACT SERPL-MCNC: 43.7 PG/ML (ref 14–72)

## 2023-12-28 PROCEDURE — 82310 ASSAY OF CALCIUM: CPT

## 2023-12-28 PROCEDURE — P9603 ONE-WAY ALLOW PRORATED MILES: HCPCS

## 2023-12-28 PROCEDURE — 36415 COLL VENOUS BLD VENIPUNCTURE: CPT

## 2023-12-28 PROCEDURE — 83970 ASSAY OF PARATHORMONE: CPT

## 2024-01-02 ENCOUNTER — HOSPITAL ENCOUNTER (OUTPATIENT)
Age: 74
Setting detail: SPECIMEN
Discharge: HOME | End: 2024-01-02
Payer: COMMERCIAL

## 2024-01-02 LAB — CALCIUM SERPL-MCNC: 8.9 MG/DL (ref 8.6–10.4)

## 2024-01-02 PROCEDURE — 83970 ASSAY OF PARATHORMONE: CPT

## 2024-01-02 PROCEDURE — 36415 COLL VENOUS BLD VENIPUNCTURE: CPT

## 2024-01-02 PROCEDURE — 82310 ASSAY OF CALCIUM: CPT

## 2024-01-02 PROCEDURE — P9603 ONE-WAY ALLOW PRORATED MILES: HCPCS

## 2024-01-03 ENCOUNTER — HOSPITAL ENCOUNTER (OUTPATIENT)
Age: 74
Setting detail: SPECIMEN
Discharge: HOME | End: 2024-01-03
Payer: COMMERCIAL

## 2024-01-03 LAB
MAGNESIUM SERPL-MCNC: 1.8 MG/DL (ref 1.6–2.4)
PTH-INTACT SERPL-MCNC: 30.8 PG/ML (ref 14–72)

## 2024-01-03 PROCEDURE — 36415 COLL VENOUS BLD VENIPUNCTURE: CPT

## 2024-01-03 PROCEDURE — P9603 ONE-WAY ALLOW PRORATED MILES: HCPCS

## 2024-01-03 PROCEDURE — 83735 ASSAY OF MAGNESIUM: CPT

## 2024-01-04 ENCOUNTER — HOSPITAL ENCOUNTER (OUTPATIENT)
Age: 74
Setting detail: SPECIMEN
Discharge: HOME | End: 2024-01-04
Payer: COMMERCIAL

## 2024-01-04 LAB
CALCIUM SERPL-MCNC: 9.4 MG/DL (ref 8.6–10.4)
MAGNESIUM SERPL-MCNC: 1.8 MG/DL (ref 1.6–2.6)
PTH-INTACT SERPL-MCNC: 10.6 PG/ML (ref 14–72)

## 2024-01-04 PROCEDURE — P9603 ONE-WAY ALLOW PRORATED MILES: HCPCS

## 2024-01-04 PROCEDURE — 83970 ASSAY OF PARATHORMONE: CPT

## 2024-01-04 PROCEDURE — 83735 ASSAY OF MAGNESIUM: CPT

## 2024-01-04 PROCEDURE — 82310 ASSAY OF CALCIUM: CPT

## 2024-01-04 PROCEDURE — 36415 COLL VENOUS BLD VENIPUNCTURE: CPT

## 2024-01-08 ENCOUNTER — HOSPITAL ENCOUNTER (OUTPATIENT)
Age: 74
Setting detail: SPECIMEN
Discharge: HOME | End: 2024-01-08
Payer: COMMERCIAL

## 2024-01-08 LAB
CALCIUM SERPL-MCNC: 8.9 MG/DL (ref 8.6–10.4)
PTH-INTACT SERPL-MCNC: 32.9 PG/ML (ref 14–72)

## 2024-01-08 PROCEDURE — P9603 ONE-WAY ALLOW PRORATED MILES: HCPCS

## 2024-01-08 PROCEDURE — 83970 ASSAY OF PARATHORMONE: CPT

## 2024-01-08 PROCEDURE — 36415 COLL VENOUS BLD VENIPUNCTURE: CPT

## 2024-01-08 PROCEDURE — 82310 ASSAY OF CALCIUM: CPT

## 2024-01-11 ENCOUNTER — HOSPITAL ENCOUNTER (OUTPATIENT)
Age: 74
Setting detail: SPECIMEN
Discharge: HOME | End: 2024-01-11

## 2024-01-11 LAB
CALCIUM SERPL-MCNC: 8.4 MG/DL (ref 8.6–10.4)
PTH-INTACT SERPL-MCNC: 42.2 PG/ML (ref 14–72)

## 2024-01-11 PROCEDURE — P9603 ONE-WAY ALLOW PRORATED MILES: HCPCS

## 2024-01-11 PROCEDURE — 82310 ASSAY OF CALCIUM: CPT

## 2024-01-11 PROCEDURE — 36415 COLL VENOUS BLD VENIPUNCTURE: CPT

## 2024-01-11 PROCEDURE — 83970 ASSAY OF PARATHORMONE: CPT

## 2024-01-15 ENCOUNTER — HOSPITAL ENCOUNTER (OUTPATIENT)
Age: 74
Setting detail: SPECIMEN
Discharge: HOME | End: 2024-01-15
Payer: COMMERCIAL

## 2024-01-15 LAB
25(OH)D3 SERPL-MCNC: 56.3 NG/ML
CALCIUM SERPL-MCNC: 8.9 MG/DL (ref 8.6–10.4)
CHOLEST SERPL-MCNC: 205 MG/DL (ref 0–199)
CHOLESTEROL/HDL RATIO: 4
ERYTHROCYTE [DISTWIDTH] IN BLOOD BY AUTOMATED COUNT: 13.1 % (ref 11.8–14.4)
HCT VFR BLD AUTO: 46.4 % (ref 36.3–47.1)
HDLC SERPL-MCNC: 54 MG/DL
HGB BLD-MCNC: 15.1 G/DL (ref 11.9–15.1)
LDLC SERPL CALC-MCNC: 138 MG/DL (ref 0–100)
MCH RBC QN AUTO: 30.6 PG (ref 25.2–33.5)
MCHC RBC AUTO-ENTMCNC: 32.5 G/DL (ref 28.4–34.8)
MCV RBC AUTO: 93.9 FL (ref 82.6–102.9)
NRBC BLD-RTO: 0 PER 100 WBC
PLATELET # BLD AUTO: 162 K/UL (ref 138–453)
PMV BLD AUTO: 11 FL (ref 8.1–13.5)
PTH-INTACT SERPL-MCNC: 20 PG/ML (ref 15–65)
RBC # BLD AUTO: 4.94 M/UL (ref 3.95–5.11)
T4 FREE SERPL-MCNC: 1.7 NG/DL (ref 0.9–1.7)
TRIGL SERPL-MCNC: 64 MG/DL
TSH SERPL DL<=0.05 MIU/L-ACNC: 0.06 UIU/ML (ref 0.3–5)
VLDLC SERPL CALC-MCNC: 13 MG/DL
WBC OTHER # BLD: 5.7 K/UL (ref 3.5–11.3)

## 2024-01-15 PROCEDURE — P9603 ONE-WAY ALLOW PRORATED MILES: HCPCS

## 2024-01-15 PROCEDURE — 80061 LIPID PANEL: CPT

## 2024-01-15 PROCEDURE — 36415 COLL VENOUS BLD VENIPUNCTURE: CPT

## 2024-01-15 PROCEDURE — 84443 ASSAY THYROID STIM HORMONE: CPT

## 2024-01-15 PROCEDURE — 85027 COMPLETE CBC AUTOMATED: CPT

## 2024-01-15 PROCEDURE — 82310 ASSAY OF CALCIUM: CPT

## 2024-01-15 PROCEDURE — 84439 ASSAY OF FREE THYROXINE: CPT

## 2024-01-15 PROCEDURE — 82306 VITAMIN D 25 HYDROXY: CPT

## 2024-01-15 PROCEDURE — 83970 ASSAY OF PARATHORMONE: CPT

## 2024-01-16 ENCOUNTER — HOSPITAL ENCOUNTER (OUTPATIENT)
Age: 74
Setting detail: SPECIMEN
Discharge: HOME | End: 2024-01-16
Payer: COMMERCIAL

## 2024-01-16 LAB
25(OH)D3 SERPL-MCNC: 47 NG/ML
IRON SATN MFR SERPL: 24 % (ref 20–55)
IRON SERPL-MCNC: 71 UG/DL (ref 37–145)
T4 SERPL-MCNC: 8.7 UG/DL (ref 4.5–11.7)
TIBC SERPL-MCNC: 293 UG/DL (ref 250–450)
TSH SERPL DL<=0.05 MIU/L-ACNC: 0.05 UIU/ML (ref 0.3–5)
UNSATURATED IRON BINDING CAPACITY: 222 UG/DL (ref 112–347)

## 2024-01-16 PROCEDURE — 83550 IRON BINDING TEST: CPT

## 2024-01-16 PROCEDURE — 84436 ASSAY OF TOTAL THYROXINE: CPT

## 2024-01-16 PROCEDURE — P9603 ONE-WAY ALLOW PRORATED MILES: HCPCS

## 2024-01-16 PROCEDURE — 82306 VITAMIN D 25 HYDROXY: CPT

## 2024-01-16 PROCEDURE — 36415 COLL VENOUS BLD VENIPUNCTURE: CPT

## 2024-01-16 PROCEDURE — 84443 ASSAY THYROID STIM HORMONE: CPT

## 2024-01-16 PROCEDURE — 83540 ASSAY OF IRON: CPT

## 2024-01-18 ENCOUNTER — HOSPITAL ENCOUNTER (OUTPATIENT)
Age: 74
Setting detail: SPECIMEN
Discharge: HOME | End: 2024-01-18
Payer: COMMERCIAL

## 2024-01-18 LAB
CALCIUM SERPL-MCNC: 8.2 MG/DL (ref 8.6–10.4)
PTH-INTACT SERPL-MCNC: 40 PG/ML (ref 15–65)

## 2024-01-18 PROCEDURE — 82310 ASSAY OF CALCIUM: CPT

## 2024-01-18 PROCEDURE — 36415 COLL VENOUS BLD VENIPUNCTURE: CPT

## 2024-01-18 PROCEDURE — P9603 ONE-WAY ALLOW PRORATED MILES: HCPCS

## 2024-01-18 PROCEDURE — 83970 ASSAY OF PARATHORMONE: CPT

## 2024-01-22 ENCOUNTER — HOSPITAL ENCOUNTER (OUTPATIENT)
Age: 74
Setting detail: SPECIMEN
Discharge: HOME | End: 2024-01-22
Payer: COMMERCIAL

## 2024-01-22 LAB
CALCIUM SERPL-MCNC: 8.6 MG/DL (ref 8.6–10.4)
PTH-INTACT SERPL-MCNC: 23 PG/ML (ref 15–65)

## 2024-01-22 PROCEDURE — P9603 ONE-WAY ALLOW PRORATED MILES: HCPCS

## 2024-01-22 PROCEDURE — 83970 ASSAY OF PARATHORMONE: CPT

## 2024-01-22 PROCEDURE — 82310 ASSAY OF CALCIUM: CPT

## 2024-01-22 PROCEDURE — 36415 COLL VENOUS BLD VENIPUNCTURE: CPT

## 2024-01-25 ENCOUNTER — HOSPITAL ENCOUNTER (OUTPATIENT)
Age: 74
Setting detail: SPECIMEN
Discharge: HOME | End: 2024-01-25
Payer: COMMERCIAL

## 2024-01-25 LAB
CALCIUM SERPL-MCNC: 8.3 MG/DL (ref 8.6–10.4)
PTH-INTACT SERPL-MCNC: 35 PG/ML (ref 15–65)

## 2024-01-25 PROCEDURE — 83970 ASSAY OF PARATHORMONE: CPT

## 2024-01-25 PROCEDURE — 36415 COLL VENOUS BLD VENIPUNCTURE: CPT

## 2024-01-25 PROCEDURE — P9603 ONE-WAY ALLOW PRORATED MILES: HCPCS

## 2024-01-25 PROCEDURE — 82310 ASSAY OF CALCIUM: CPT

## 2024-01-29 ENCOUNTER — HOSPITAL ENCOUNTER (OUTPATIENT)
Age: 74
Setting detail: SPECIMEN
Discharge: HOME | End: 2024-01-29
Payer: COMMERCIAL

## 2024-01-29 LAB
CALCIUM SERPL-MCNC: 8.4 MG/DL (ref 8.6–10.4)
PTH-INTACT SERPL-MCNC: 31.5 PG/ML (ref 14–72)

## 2024-01-29 PROCEDURE — 83970 ASSAY OF PARATHORMONE: CPT

## 2024-01-29 PROCEDURE — P9603 ONE-WAY ALLOW PRORATED MILES: HCPCS

## 2024-01-29 PROCEDURE — 82310 ASSAY OF CALCIUM: CPT

## 2024-01-29 PROCEDURE — 36415 COLL VENOUS BLD VENIPUNCTURE: CPT

## 2024-02-01 ENCOUNTER — HOSPITAL ENCOUNTER (OUTPATIENT)
Age: 74
Setting detail: SPECIMEN
Discharge: HOME | End: 2024-02-01
Payer: COMMERCIAL

## 2024-02-01 LAB
CALCIUM SERPL-MCNC: 8.9 MG/DL (ref 8.6–10.4)
PTH-INTACT SERPL-MCNC: 25.3 PG/ML (ref 14–72)

## 2024-02-01 PROCEDURE — 83970 ASSAY OF PARATHORMONE: CPT

## 2024-02-01 PROCEDURE — 36415 COLL VENOUS BLD VENIPUNCTURE: CPT

## 2024-02-01 PROCEDURE — 82310 ASSAY OF CALCIUM: CPT

## 2024-02-01 PROCEDURE — P9603 ONE-WAY ALLOW PRORATED MILES: HCPCS

## 2024-02-05 ENCOUNTER — HOSPITAL ENCOUNTER (OUTPATIENT)
Age: 74
Setting detail: SPECIMEN
Discharge: HOME | End: 2024-02-05
Payer: COMMERCIAL

## 2024-02-05 LAB
CALCIUM SERPL-MCNC: 8.4 MG/DL (ref 8.6–10.4)
PTH-INTACT SERPL-MCNC: 38 PG/ML (ref 14–72)

## 2024-02-05 PROCEDURE — P9603 ONE-WAY ALLOW PRORATED MILES: HCPCS

## 2024-02-05 PROCEDURE — 83970 ASSAY OF PARATHORMONE: CPT

## 2024-02-05 PROCEDURE — 82310 ASSAY OF CALCIUM: CPT

## 2024-02-05 PROCEDURE — 36415 COLL VENOUS BLD VENIPUNCTURE: CPT

## 2024-02-08 ENCOUNTER — HOSPITAL ENCOUNTER (OUTPATIENT)
Age: 74
Setting detail: SPECIMEN
Discharge: HOME | End: 2024-02-08
Payer: COMMERCIAL

## 2024-02-08 LAB
CALCIUM SERPL-MCNC: 8.6 MG/DL (ref 8.6–10.4)
PTH-INTACT SERPL-MCNC: 27.5 PG/ML (ref 14–72)

## 2024-02-08 PROCEDURE — 83970 ASSAY OF PARATHORMONE: CPT

## 2024-02-08 PROCEDURE — 82310 ASSAY OF CALCIUM: CPT

## 2024-02-08 PROCEDURE — P9603 ONE-WAY ALLOW PRORATED MILES: HCPCS

## 2024-02-08 PROCEDURE — 36415 COLL VENOUS BLD VENIPUNCTURE: CPT

## 2024-02-12 ENCOUNTER — HOSPITAL ENCOUNTER (OUTPATIENT)
Age: 74
Setting detail: SPECIMEN
Discharge: HOME | End: 2024-02-12
Payer: COMMERCIAL

## 2024-02-12 LAB
CALCIUM SERPL-MCNC: 8 MG/DL (ref 8.6–10.4)
PTH-INTACT SERPL-MCNC: 50.2 PG/ML (ref 14–72)

## 2024-02-12 PROCEDURE — 83970 ASSAY OF PARATHORMONE: CPT

## 2024-02-12 PROCEDURE — P9603 ONE-WAY ALLOW PRORATED MILES: HCPCS

## 2024-02-12 PROCEDURE — 82310 ASSAY OF CALCIUM: CPT

## 2024-02-15 ENCOUNTER — HOSPITAL ENCOUNTER (OUTPATIENT)
Age: 74
Setting detail: SPECIMEN
Discharge: HOME | End: 2024-02-15

## 2024-02-15 LAB
CALCIUM SERPL-MCNC: 9.4 MG/DL (ref 8.6–10.4)
PTH-INTACT SERPL-MCNC: 15.7 PG/ML (ref 14–72)

## 2024-02-15 PROCEDURE — 83970 ASSAY OF PARATHORMONE: CPT

## 2024-02-15 PROCEDURE — P9603 ONE-WAY ALLOW PRORATED MILES: HCPCS

## 2024-02-15 PROCEDURE — 82310 ASSAY OF CALCIUM: CPT

## 2024-02-15 PROCEDURE — 36415 COLL VENOUS BLD VENIPUNCTURE: CPT

## 2024-02-19 ENCOUNTER — HOSPITAL ENCOUNTER (OUTPATIENT)
Age: 74
Setting detail: SPECIMEN
Discharge: HOME | End: 2024-02-19
Payer: COMMERCIAL

## 2024-02-19 LAB
CALCIUM SERPL-MCNC: 8.8 MG/DL (ref 8.6–10.4)
PTH-INTACT SERPL-MCNC: 22.5 PG/ML (ref 14–72)

## 2024-02-19 PROCEDURE — 83970 ASSAY OF PARATHORMONE: CPT

## 2024-02-19 PROCEDURE — 36415 COLL VENOUS BLD VENIPUNCTURE: CPT

## 2024-02-19 PROCEDURE — P9603 ONE-WAY ALLOW PRORATED MILES: HCPCS

## 2024-02-19 PROCEDURE — 82310 ASSAY OF CALCIUM: CPT

## 2024-02-22 ENCOUNTER — HOSPITAL ENCOUNTER (OUTPATIENT)
Age: 74
Setting detail: SPECIMEN
Discharge: HOME | End: 2024-02-22
Payer: COMMERCIAL

## 2024-02-22 LAB
CALCIUM SERPL-MCNC: 8.6 MG/DL (ref 8.6–10.4)
PTH-INTACT SERPL-MCNC: 24.6 PG/ML (ref 14–72)

## 2024-02-22 PROCEDURE — 83970 ASSAY OF PARATHORMONE: CPT

## 2024-02-22 PROCEDURE — 36415 COLL VENOUS BLD VENIPUNCTURE: CPT

## 2024-02-22 PROCEDURE — P9603 ONE-WAY ALLOW PRORATED MILES: HCPCS

## 2024-02-22 PROCEDURE — 82310 ASSAY OF CALCIUM: CPT

## 2024-02-26 ENCOUNTER — HOSPITAL ENCOUNTER (OUTPATIENT)
Age: 74
Setting detail: SPECIMEN
Discharge: HOME | End: 2024-02-26
Payer: COMMERCIAL

## 2024-02-26 LAB
CALCIUM SERPL-MCNC: 8.9 MG/DL (ref 8.6–10.4)
PTH-INTACT SERPL-MCNC: 25.6 PG/ML (ref 14–72)

## 2024-02-26 PROCEDURE — 82310 ASSAY OF CALCIUM: CPT

## 2024-02-26 PROCEDURE — 83970 ASSAY OF PARATHORMONE: CPT

## 2024-02-26 PROCEDURE — 36415 COLL VENOUS BLD VENIPUNCTURE: CPT

## 2024-02-26 PROCEDURE — P9603 ONE-WAY ALLOW PRORATED MILES: HCPCS

## 2024-03-04 ENCOUNTER — HOSPITAL ENCOUNTER (OUTPATIENT)
Age: 74
Setting detail: SPECIMEN
Discharge: HOME | End: 2024-03-04
Payer: COMMERCIAL

## 2024-03-04 LAB
CALCIUM SERPL-MCNC: 8.7 MG/DL (ref 8.6–10.4)
PTH-INTACT SERPL-MCNC: 21.1 PG/ML (ref 14–72)

## 2024-03-04 PROCEDURE — 82310 ASSAY OF CALCIUM: CPT

## 2024-03-04 PROCEDURE — 83970 ASSAY OF PARATHORMONE: CPT

## 2024-03-04 PROCEDURE — P9603 ONE-WAY ALLOW PRORATED MILES: HCPCS

## 2024-03-04 PROCEDURE — 36415 COLL VENOUS BLD VENIPUNCTURE: CPT

## 2024-03-07 ENCOUNTER — HOSPITAL ENCOUNTER (OUTPATIENT)
Age: 74
Setting detail: SPECIMEN
Discharge: HOME | End: 2024-03-07

## 2024-03-07 LAB
CALCIUM SERPL-MCNC: 8.6 MG/DL (ref 8.6–10.4)
PTH-INTACT SERPL-MCNC: 28 PG/ML (ref 15–65)

## 2024-03-07 PROCEDURE — 83970 ASSAY OF PARATHORMONE: CPT

## 2024-03-07 PROCEDURE — 36415 COLL VENOUS BLD VENIPUNCTURE: CPT

## 2024-03-07 PROCEDURE — P9603 ONE-WAY ALLOW PRORATED MILES: HCPCS

## 2024-03-07 PROCEDURE — 82310 ASSAY OF CALCIUM: CPT

## 2024-03-11 ENCOUNTER — HOSPITAL ENCOUNTER (OUTPATIENT)
Age: 74
Setting detail: SPECIMEN
Discharge: HOME | End: 2024-03-11
Payer: COMMERCIAL

## 2024-03-11 LAB
CALCIUM SERPL-MCNC: 8.7 MG/DL (ref 8.6–10.4)
PTH-INTACT SERPL-MCNC: 22 PG/ML (ref 15–65)

## 2024-03-11 PROCEDURE — P9603 ONE-WAY ALLOW PRORATED MILES: HCPCS

## 2024-03-11 PROCEDURE — 83970 ASSAY OF PARATHORMONE: CPT

## 2024-03-11 PROCEDURE — 82310 ASSAY OF CALCIUM: CPT

## 2024-03-11 PROCEDURE — 36415 COLL VENOUS BLD VENIPUNCTURE: CPT
